# Patient Record
Sex: FEMALE | Race: BLACK OR AFRICAN AMERICAN | NOT HISPANIC OR LATINO | URBAN - METROPOLITAN AREA
[De-identification: names, ages, dates, MRNs, and addresses within clinical notes are randomized per-mention and may not be internally consistent; named-entity substitution may affect disease eponyms.]

---

## 2018-12-23 ENCOUNTER — EMERGENCY (EMERGENCY)
Facility: HOSPITAL | Age: 38
LOS: 1 days | Discharge: HOME | End: 2018-12-23
Attending: EMERGENCY MEDICINE | Admitting: EMERGENCY MEDICINE
Payer: MEDICAID

## 2018-12-23 VITALS
SYSTOLIC BLOOD PRESSURE: 130 MMHG | RESPIRATION RATE: 16 BRPM | TEMPERATURE: 98 F | HEART RATE: 72 BPM | DIASTOLIC BLOOD PRESSURE: 50 MMHG

## 2018-12-23 PROCEDURE — 99283 EMERGENCY DEPT VISIT LOW MDM: CPT

## 2018-12-23 PROCEDURE — 73502 X-RAY EXAM HIP UNI 2-3 VIEWS: CPT | Mod: 26,LT

## 2018-12-23 RX ORDER — IBUPROFEN 200 MG
1 TABLET ORAL
Qty: 30 | Refills: 0
Start: 2018-12-23 | End: 2019-01-01

## 2018-12-23 NOTE — ED PROVIDER NOTE - PROGRESS NOTE DETAILS
BREANNA Haley: Received signout and discussed plan with QUID attending. Xrays negative, pain well controlled. Stable for d/c home with followup. Pt amenable with plan.

## 2018-12-23 NOTE — ED PROVIDER NOTE - PLAN OF CARE
Rest, ice, elevate area.  Take Motrin 600mg every 8 hrs with food for pain.  Follow up with PMD within 48-72 hrs.  Any worsening pain, swelling, weakness, numbness return to ED. Ortho referral list provided please follow with them for further recommendations, possibly further imaging as deemed necessary.

## 2018-12-23 NOTE — ED PROVIDER NOTE - LOWER EXTREMITY EXAM, LEFT
tenderness w/ w/ abduction and flexion at the lt hip w/ no palpable masses, no ecchymosis, stable pelvis/TENDERNESS

## 2018-12-23 NOTE — ED PROVIDER NOTE - CARE PLAN
Principal Discharge DX:	Hip pain  Assessment and plan of treatment:	Rest, ice, elevate area.  Take Motrin 600mg every 8 hrs with food for pain.  Follow up with PMD within 48-72 hrs.  Any worsening pain, swelling, weakness, numbness return to ED. Ortho referral list provided please follow with them for further recommendations, possibly further imaging as deemed necessary.

## 2018-12-23 NOTE — ED PROVIDER NOTE - OBJECTIVE STATEMENT
39 y/o F w/ PMHx of Preeclampsia presents to ED c/o acute on chronic lt hip pain. Pt reports [id number] h/o atraumatic lt hip pain noted to be worsening in the last few days. States having difficulty ambulating secondary to pain. Denies other complaints. 37 y/o F w/ PMHx of Preeclampsia presents to ED c/o acute on chronic lt hip pain. Pt reports [id number] h/o atraumatic lt hip pain noted to be worsening in the last few days. States having difficulty ambulating secondary to pain. Pt recently traveled to the [facility] and returned a few days ago via a x5hr flight. Denies other complaints.

## 2018-12-23 NOTE — ED ADULT TRIAGE NOTE - CHIEF COMPLAINT QUOTE
pt amb to triage c/o L hip pain x 6 months worsening over past few days, states now having difficulty walking, + ROM in leg w/ pain, + pedal pulse, denies falls or trauma to area

## 2019-01-10 ENCOUNTER — APPOINTMENT (OUTPATIENT)
Dept: ORTHOPEDIC SURGERY | Facility: CLINIC | Age: 39
End: 2019-01-10

## 2021-07-21 ENCOUNTER — TRANSCRIPTION ENCOUNTER (OUTPATIENT)
Age: 41
End: 2021-07-21

## 2021-07-22 ENCOUNTER — RESULT REVIEW (OUTPATIENT)
Age: 41
End: 2021-07-22

## 2021-07-22 ENCOUNTER — OUTPATIENT (OUTPATIENT)
Dept: OUTPATIENT SERVICES | Facility: HOSPITAL | Age: 41
LOS: 1 days | Discharge: HOME | End: 2021-07-22
Payer: MEDICAID

## 2021-07-22 PROCEDURE — 88305 TISSUE EXAM BY PATHOLOGIST: CPT | Mod: 26

## 2021-07-22 PROCEDURE — 19318 BREAST REDUCTION: CPT | Mod: AS,50

## 2021-07-31 LAB — SURGICAL PATHOLOGY STUDY: SIGNIFICANT CHANGE UP

## 2023-03-10 ENCOUNTER — APPOINTMENT (OUTPATIENT)
Dept: OBGYN | Facility: CLINIC | Age: 43
End: 2023-03-10
Payer: COMMERCIAL

## 2023-03-10 VITALS
HEART RATE: 85 BPM | HEIGHT: 63 IN | SYSTOLIC BLOOD PRESSURE: 121 MMHG | BODY MASS INDEX: 27.64 KG/M2 | DIASTOLIC BLOOD PRESSURE: 73 MMHG | WEIGHT: 156 LBS

## 2023-03-10 DIAGNOSIS — Z78.9 OTHER SPECIFIED HEALTH STATUS: ICD-10-CM

## 2023-03-10 PROCEDURE — 99203 OFFICE O/P NEW LOW 30 MIN: CPT

## 2023-03-11 LAB
ALBUMIN SERPL ELPH-MCNC: 3.8 G/DL
ALP BLD-CCNC: 45 U/L
ALT SERPL-CCNC: 13 U/L
ANION GAP SERPL CALC-SCNC: 14 MMOL/L
AST SERPL-CCNC: 16 U/L
BASOPHILS # BLD AUTO: 0.04 K/UL
BASOPHILS NFR BLD AUTO: 0.4 %
BILIRUB SERPL-MCNC: 0.2 MG/DL
BUN SERPL-MCNC: 6 MG/DL
C TRACH RRNA SPEC QL NAA+PROBE: NOT DETECTED
CALCIUM SERPL-MCNC: 9.1 MG/DL
CHLORIDE SERPL-SCNC: 101 MMOL/L
CO2 SERPL-SCNC: 20 MMOL/L
CREAT SERPL-MCNC: 0.56 MG/DL
EGFR: 116 ML/MIN/1.73M2
EOSINOPHIL # BLD AUTO: 0.05 K/UL
EOSINOPHIL NFR BLD AUTO: 0.5 %
ESTIMATED AVERAGE GLUCOSE: 120 MG/DL
GLUCOSE SERPL-MCNC: 58 MG/DL
HBA1C MFR BLD HPLC: 5.8 %
HBV SURFACE AG SER QL: NONREACTIVE
HCT VFR BLD CALC: 36.1 %
HCV AB SER QL: NONREACTIVE
HCV S/CO RATIO: 0.27 S/CO
HGB BLD-MCNC: 12.1 G/DL
HIV1+2 AB SPEC QL IA.RAPID: NONREACTIVE
IMM GRANULOCYTES NFR BLD AUTO: 0.5 %
LYMPHOCYTES # BLD AUTO: 2.08 K/UL
LYMPHOCYTES NFR BLD AUTO: 19.1 %
MAN DIFF?: NORMAL
MCHC RBC-ENTMCNC: 28.5 PG
MCHC RBC-ENTMCNC: 33.5 GM/DL
MCV RBC AUTO: 85.1 FL
MEV IGG FLD QL IA: 24.6 AU/ML
MEV IGG+IGM SER-IMP: POSITIVE
MONOCYTES # BLD AUTO: 0.71 K/UL
MONOCYTES NFR BLD AUTO: 6.5 %
N GONORRHOEA RRNA SPEC QL NAA+PROBE: NOT DETECTED
NEUTROPHILS # BLD AUTO: 7.97 K/UL
NEUTROPHILS NFR BLD AUTO: 73 %
PLATELET # BLD AUTO: 291 K/UL
POTASSIUM SERPL-SCNC: 3.8 MMOL/L
PROT SERPL-MCNC: 7.1 G/DL
RBC # BLD: 4.24 M/UL
RBC # FLD: 14.2 %
RUBV IGG FLD-ACNC: 0.7 INDEX
RUBV IGG SER-IMP: NEGATIVE
SODIUM SERPL-SCNC: 135 MMOL/L
SOURCE AMPLIFICATION: NORMAL
T GONDII AB SER-IMP: NEGATIVE
T GONDII AB SER-IMP: POSITIVE
T GONDII IGG SER QL: >400 IU/ML
T GONDII IGM SER QL: 7.3 AU/ML
T PALLIDUM AB SER QL IA: NEGATIVE
TSH SERPL-ACNC: 1.43 UIU/ML
VZV AB TITR SER: NEGATIVE
VZV IGG SER IF-ACNC: 59.6 INDEX
WBC # FLD AUTO: 10.91 K/UL

## 2023-03-13 LAB
ABO + RH PNL BLD: NORMAL
BACTERIA UR CULT: NORMAL
BLD GP AB SCN SERPL QL: NORMAL
HGB A MFR BLD: 97.2 %
HGB A2 MFR BLD: 2.8 %
HGB FRACT BLD-IMP: NORMAL
HPV HIGH+LOW RISK DNA PNL CVX: NOT DETECTED
LEAD BLD-MCNC: <1 UG/DL

## 2023-03-15 ENCOUNTER — NON-APPOINTMENT (OUTPATIENT)
Age: 43
End: 2023-03-15

## 2023-03-15 LAB
AR GENE MUT ANL BLD/T: NORMAL
B19V IGG SER QL IA: 2.28 INDEX
B19V IGG+IGM SER-IMP: NORMAL
B19V IGG+IGM SER-IMP: POSITIVE
B19V IGM FLD-ACNC: 0.32 INDEX
B19V IGM SER-ACNC: NEGATIVE
CYTOLOGY CVX/VAG DOC THIN PREP: ABNORMAL
FMR1 GENE MUT ANL BLD/T: NORMAL
M TB IFN-G BLD-IMP: NEGATIVE
QUANTIFERON TB PLUS MITOGEN MINUS NIL: 7.53 IU/ML
QUANTIFERON TB PLUS NIL: 0.05 IU/ML
QUANTIFERON TB PLUS TB1 MINUS NIL: -0.01 IU/ML
QUANTIFERON TB PLUS TB2 MINUS NIL: 0.01 IU/ML

## 2023-03-16 ENCOUNTER — NON-APPOINTMENT (OUTPATIENT)
Age: 43
End: 2023-03-16

## 2023-03-16 ENCOUNTER — TRANSCRIPTION ENCOUNTER (OUTPATIENT)
Age: 43
End: 2023-03-16

## 2023-03-16 LAB — CFTR MUT TESTED BLD/T: NEGATIVE

## 2023-04-04 ENCOUNTER — APPOINTMENT (OUTPATIENT)
Dept: OBGYN | Facility: CLINIC | Age: 43
End: 2023-04-04
Payer: COMMERCIAL

## 2023-04-04 VITALS
WEIGHT: 162 LBS | HEIGHT: 63 IN | HEART RATE: 84 BPM | BODY MASS INDEX: 28.7 KG/M2 | DIASTOLIC BLOOD PRESSURE: 76 MMHG | SYSTOLIC BLOOD PRESSURE: 127 MMHG

## 2023-04-04 PROCEDURE — 0502F SUBSEQUENT PRENATAL CARE: CPT

## 2023-04-07 LAB
AFP MOM: 0.81
AFP VALUE: 37.9 NG/ML
ALPHA FETOPROTEIN SERUM COMMENT: NORMAL
ALPHA FETOPROTEIN SERUM INTERPRETATION: NORMAL
ALPHA FETOPROTEIN SERUM RESULTS: NORMAL
ALPHA FETOPROTEIN SERUM TEST RESULTS: NORMAL
GESTATIONAL AGE BASED ON: NORMAL
GESTATIONAL AGE ON COLLECTION DATE: 18 WEEKS
INSULIN DEP DIABETES: NO
MATERNAL AGE AT EDD AFP: 44.1 YR
MULTIPLE GESTATION: NO
OSBR RISK 1 IN: NORMAL
RACE: NORMAL
WEIGHT AFP: 162 LBS

## 2023-04-18 ENCOUNTER — NON-APPOINTMENT (OUTPATIENT)
Age: 43
End: 2023-04-18

## 2023-04-18 ENCOUNTER — ASOB RESULT (OUTPATIENT)
Age: 43
End: 2023-04-18

## 2023-04-18 ENCOUNTER — APPOINTMENT (OUTPATIENT)
Dept: ANTEPARTUM | Facility: CLINIC | Age: 43
End: 2023-04-18
Payer: MEDICAID

## 2023-04-18 PROCEDURE — 99202 OFFICE O/P NEW SF 15 MIN: CPT | Mod: 25

## 2023-04-18 PROCEDURE — 76811 OB US DETAILED SNGL FETUS: CPT

## 2023-04-24 ENCOUNTER — NON-APPOINTMENT (OUTPATIENT)
Age: 43
End: 2023-04-24

## 2023-05-04 ENCOUNTER — APPOINTMENT (OUTPATIENT)
Dept: OBGYN | Facility: CLINIC | Age: 43
End: 2023-05-04
Payer: MEDICAID

## 2023-05-04 ENCOUNTER — NON-APPOINTMENT (OUTPATIENT)
Age: 43
End: 2023-05-04

## 2023-05-04 VITALS
HEIGHT: 63 IN | DIASTOLIC BLOOD PRESSURE: 77 MMHG | BODY MASS INDEX: 30.12 KG/M2 | WEIGHT: 170 LBS | SYSTOLIC BLOOD PRESSURE: 131 MMHG

## 2023-05-04 PROCEDURE — 0502F SUBSEQUENT PRENATAL CARE: CPT

## 2023-05-08 ENCOUNTER — NON-APPOINTMENT (OUTPATIENT)
Age: 43
End: 2023-05-08

## 2023-05-26 ENCOUNTER — APPOINTMENT (OUTPATIENT)
Dept: ANTEPARTUM | Facility: CLINIC | Age: 43
End: 2023-05-26
Payer: MEDICAID

## 2023-05-26 ENCOUNTER — ASOB RESULT (OUTPATIENT)
Age: 43
End: 2023-05-26

## 2023-05-26 PROCEDURE — 76816 OB US FOLLOW-UP PER FETUS: CPT

## 2023-06-06 ENCOUNTER — APPOINTMENT (OUTPATIENT)
Dept: OBGYN | Facility: CLINIC | Age: 43
End: 2023-06-06
Payer: MEDICAID

## 2023-06-06 ENCOUNTER — NON-APPOINTMENT (OUTPATIENT)
Age: 43
End: 2023-06-06

## 2023-06-06 VITALS
WEIGHT: 174 LBS | BODY MASS INDEX: 30.83 KG/M2 | HEIGHT: 63 IN | DIASTOLIC BLOOD PRESSURE: 74 MMHG | SYSTOLIC BLOOD PRESSURE: 117 MMHG | HEART RATE: 96 BPM

## 2023-06-06 PROCEDURE — 0502F SUBSEQUENT PRENATAL CARE: CPT

## 2023-06-07 ENCOUNTER — NON-APPOINTMENT (OUTPATIENT)
Age: 43
End: 2023-06-07

## 2023-06-07 LAB
GLUCOSE 1H P 100 G GLC PO SERPL-MCNC: 170 MG/DL
T PALLIDUM AB SER QL IA: NEGATIVE

## 2023-06-21 ENCOUNTER — TRANSCRIPTION ENCOUNTER (OUTPATIENT)
Age: 43
End: 2023-06-21

## 2023-06-23 ENCOUNTER — ASOB RESULT (OUTPATIENT)
Age: 43
End: 2023-06-23

## 2023-06-23 ENCOUNTER — NON-APPOINTMENT (OUTPATIENT)
Age: 43
End: 2023-06-23

## 2023-06-23 ENCOUNTER — APPOINTMENT (OUTPATIENT)
Dept: OBGYN | Facility: CLINIC | Age: 43
End: 2023-06-23
Payer: MEDICAID

## 2023-06-23 ENCOUNTER — APPOINTMENT (OUTPATIENT)
Dept: ANTEPARTUM | Facility: CLINIC | Age: 43
End: 2023-06-23
Payer: MEDICAID

## 2023-06-23 VITALS
HEIGHT: 63 IN | HEART RATE: 95 BPM | BODY MASS INDEX: 31.01 KG/M2 | SYSTOLIC BLOOD PRESSURE: 138 MMHG | WEIGHT: 175 LBS | DIASTOLIC BLOOD PRESSURE: 67 MMHG

## 2023-06-23 PROCEDURE — 76819 FETAL BIOPHYS PROFIL W/O NST: CPT

## 2023-06-23 PROCEDURE — 0502F SUBSEQUENT PRENATAL CARE: CPT

## 2023-06-23 PROCEDURE — 76816 OB US FOLLOW-UP PER FETUS: CPT

## 2023-06-26 ENCOUNTER — TRANSCRIPTION ENCOUNTER (OUTPATIENT)
Age: 43
End: 2023-06-26

## 2023-07-11 ENCOUNTER — APPOINTMENT (OUTPATIENT)
Dept: OBGYN | Facility: CLINIC | Age: 43
End: 2023-07-11
Payer: MEDICAID

## 2023-07-11 VITALS
HEIGHT: 63 IN | SYSTOLIC BLOOD PRESSURE: 131 MMHG | DIASTOLIC BLOOD PRESSURE: 77 MMHG | BODY MASS INDEX: 31.18 KG/M2 | WEIGHT: 176 LBS

## 2023-07-11 PROCEDURE — 0502F SUBSEQUENT PRENATAL CARE: CPT

## 2023-07-21 ENCOUNTER — APPOINTMENT (OUTPATIENT)
Dept: ANTEPARTUM | Facility: CLINIC | Age: 43
End: 2023-07-21
Payer: MEDICAID

## 2023-07-21 ENCOUNTER — ASOB RESULT (OUTPATIENT)
Age: 43
End: 2023-07-21

## 2023-07-21 PROCEDURE — 76816 OB US FOLLOW-UP PER FETUS: CPT

## 2023-07-21 PROCEDURE — 99213 OFFICE O/P EST LOW 20 MIN: CPT | Mod: 25

## 2023-07-21 PROCEDURE — 76818 FETAL BIOPHYS PROFILE W/NST: CPT

## 2023-07-24 ENCOUNTER — TRANSCRIPTION ENCOUNTER (OUTPATIENT)
Age: 43
End: 2023-07-24

## 2023-07-25 ENCOUNTER — APPOINTMENT (OUTPATIENT)
Dept: OBGYN | Facility: CLINIC | Age: 43
End: 2023-07-25
Payer: MEDICAID

## 2023-07-25 ENCOUNTER — NON-APPOINTMENT (OUTPATIENT)
Age: 43
End: 2023-07-25

## 2023-07-25 VITALS
HEIGHT: 63 IN | WEIGHT: 178 LBS | DIASTOLIC BLOOD PRESSURE: 84 MMHG | SYSTOLIC BLOOD PRESSURE: 133 MMHG | BODY MASS INDEX: 31.54 KG/M2

## 2023-07-25 PROCEDURE — 0502F SUBSEQUENT PRENATAL CARE: CPT

## 2023-07-27 ENCOUNTER — APPOINTMENT (OUTPATIENT)
Dept: ANTEPARTUM | Facility: CLINIC | Age: 43
End: 2023-07-27
Payer: MEDICAID

## 2023-07-27 ENCOUNTER — ASOB RESULT (OUTPATIENT)
Age: 43
End: 2023-07-27

## 2023-07-27 PROCEDURE — 76818 FETAL BIOPHYS PROFILE W/NST: CPT

## 2023-08-04 ENCOUNTER — ASOB RESULT (OUTPATIENT)
Age: 43
End: 2023-08-04

## 2023-08-04 ENCOUNTER — APPOINTMENT (OUTPATIENT)
Dept: ANTEPARTUM | Facility: CLINIC | Age: 43
End: 2023-08-04
Payer: MEDICAID

## 2023-08-04 PROCEDURE — 76818 FETAL BIOPHYS PROFILE W/NST: CPT

## 2023-08-07 ENCOUNTER — APPOINTMENT (OUTPATIENT)
Dept: ANTEPARTUM | Facility: CLINIC | Age: 43
End: 2023-08-07

## 2023-08-08 ENCOUNTER — NON-APPOINTMENT (OUTPATIENT)
Age: 43
End: 2023-08-08

## 2023-08-08 ENCOUNTER — APPOINTMENT (OUTPATIENT)
Dept: OBGYN | Facility: CLINIC | Age: 43
End: 2023-08-08
Payer: MEDICAID

## 2023-08-08 VITALS
WEIGHT: 182 LBS | BODY MASS INDEX: 32.25 KG/M2 | HEIGHT: 63 IN | DIASTOLIC BLOOD PRESSURE: 80 MMHG | SYSTOLIC BLOOD PRESSURE: 135 MMHG | HEART RATE: 89 BPM

## 2023-08-08 DIAGNOSIS — O09.522 SUPERVISION OF ELDERLY MULTIGRAVIDA, SECOND TRIMESTER: ICD-10-CM

## 2023-08-08 PROCEDURE — 0502F SUBSEQUENT PRENATAL CARE: CPT

## 2023-08-09 LAB — HIV1+2 AB SPEC QL IA.RAPID: NONREACTIVE

## 2023-08-11 ENCOUNTER — APPOINTMENT (OUTPATIENT)
Dept: ANTEPARTUM | Facility: CLINIC | Age: 43
End: 2023-08-11
Payer: MEDICAID

## 2023-08-11 ENCOUNTER — ASOB RESULT (OUTPATIENT)
Age: 43
End: 2023-08-11

## 2023-08-11 LAB
GP B STREP DNA SPEC QL NAA+PROBE: NOT DETECTED
SOURCE GBS: NORMAL

## 2023-08-11 PROCEDURE — 76816 OB US FOLLOW-UP PER FETUS: CPT

## 2023-08-11 PROCEDURE — 76818 FETAL BIOPHYS PROFILE W/NST: CPT

## 2023-08-15 ENCOUNTER — NON-APPOINTMENT (OUTPATIENT)
Age: 43
End: 2023-08-15

## 2023-08-15 ENCOUNTER — APPOINTMENT (OUTPATIENT)
Dept: OBGYN | Facility: CLINIC | Age: 43
End: 2023-08-15
Payer: MEDICAID

## 2023-08-15 VITALS
HEIGHT: 63 IN | WEIGHT: 182 LBS | SYSTOLIC BLOOD PRESSURE: 142 MMHG | DIASTOLIC BLOOD PRESSURE: 82 MMHG | HEART RATE: 94 BPM | BODY MASS INDEX: 32.25 KG/M2

## 2023-08-15 VITALS — DIASTOLIC BLOOD PRESSURE: 84 MMHG | SYSTOLIC BLOOD PRESSURE: 139 MMHG

## 2023-08-15 PROCEDURE — 0502F SUBSEQUENT PRENATAL CARE: CPT

## 2023-08-18 ENCOUNTER — ASOB RESULT (OUTPATIENT)
Age: 43
End: 2023-08-18

## 2023-08-18 ENCOUNTER — APPOINTMENT (OUTPATIENT)
Dept: ANTEPARTUM | Facility: CLINIC | Age: 43
End: 2023-08-18
Payer: MEDICAID

## 2023-08-18 PROCEDURE — 76818 FETAL BIOPHYS PROFILE W/NST: CPT

## 2023-08-22 ENCOUNTER — APPOINTMENT (OUTPATIENT)
Dept: OBGYN | Facility: CLINIC | Age: 43
End: 2023-08-22
Payer: MEDICAID

## 2023-08-22 ENCOUNTER — NON-APPOINTMENT (OUTPATIENT)
Age: 43
End: 2023-08-22

## 2023-08-22 ENCOUNTER — INPATIENT (INPATIENT)
Facility: HOSPITAL | Age: 43
LOS: 3 days | Discharge: HOME HEALTH | End: 2023-08-26
Attending: STUDENT IN AN ORGANIZED HEALTH CARE EDUCATION/TRAINING PROGRAM | Admitting: STUDENT IN AN ORGANIZED HEALTH CARE EDUCATION/TRAINING PROGRAM
Payer: MEDICAID

## 2023-08-22 VITALS
DIASTOLIC BLOOD PRESSURE: 83 MMHG | HEART RATE: 91 BPM | WEIGHT: 184 LBS | BODY MASS INDEX: 32.6 KG/M2 | SYSTOLIC BLOOD PRESSURE: 143 MMHG | HEIGHT: 63 IN

## 2023-08-22 VITALS — TEMPERATURE: 98 F | RESPIRATION RATE: 16 BRPM

## 2023-08-22 DIAGNOSIS — Z98.890 OTHER SPECIFIED POSTPROCEDURAL STATES: Chronic | ICD-10-CM

## 2023-08-22 DIAGNOSIS — Z96.641 PRESENCE OF RIGHT ARTIFICIAL HIP JOINT: Chronic | ICD-10-CM

## 2023-08-22 DIAGNOSIS — O09.523 SUPERVISION OF ELDERLY MULTIGRAVIDA, THIRD TRIMESTER: ICD-10-CM

## 2023-08-22 DIAGNOSIS — O14.90 UNSPECIFIED PRE-ECLAMPSIA, UNSPECIFIED TRIMESTER: ICD-10-CM

## 2023-08-22 DIAGNOSIS — O26.899 OTHER SPECIFIED PREGNANCY RELATED CONDITIONS, UNSPECIFIED TRIMESTER: ICD-10-CM

## 2023-08-22 LAB
ALBUMIN SERPL ELPH-MCNC: 3.4 G/DL — SIGNIFICANT CHANGE UP (ref 3.3–5)
ALP SERPL-CCNC: 190 U/L — HIGH (ref 40–120)
ALT FLD-CCNC: 12 U/L — SIGNIFICANT CHANGE UP (ref 4–33)
ANION GAP SERPL CALC-SCNC: 15 MMOL/L — HIGH (ref 7–14)
APPEARANCE UR: ABNORMAL
APTT BLD: 27.3 SEC — SIGNIFICANT CHANGE UP (ref 24.5–35.6)
AST SERPL-CCNC: 24 U/L — SIGNIFICANT CHANGE UP (ref 4–32)
BACTERIA # UR AUTO: ABNORMAL /HPF
BASOPHILS # BLD AUTO: 0.04 K/UL — SIGNIFICANT CHANGE UP (ref 0–0.2)
BASOPHILS NFR BLD AUTO: 0.3 % — SIGNIFICANT CHANGE UP (ref 0–2)
BILIRUB SERPL-MCNC: 0.2 MG/DL — SIGNIFICANT CHANGE UP (ref 0.2–1.2)
BILIRUB UR-MCNC: NEGATIVE — SIGNIFICANT CHANGE UP
BLD GP AB SCN SERPL QL: NEGATIVE — SIGNIFICANT CHANGE UP
BUN SERPL-MCNC: 7 MG/DL — SIGNIFICANT CHANGE UP (ref 7–23)
CALCIUM SERPL-MCNC: 9.5 MG/DL — SIGNIFICANT CHANGE UP (ref 8.4–10.5)
CAST: 0 /LPF — SIGNIFICANT CHANGE UP (ref 0–4)
CHLORIDE SERPL-SCNC: 101 MMOL/L — SIGNIFICANT CHANGE UP (ref 98–107)
CO2 SERPL-SCNC: 18 MMOL/L — LOW (ref 22–31)
COLOR SPEC: YELLOW — SIGNIFICANT CHANGE UP
CREAT ?TM UR-MCNC: 77 MG/DL — SIGNIFICANT CHANGE UP
CREAT SERPL-MCNC: 0.55 MG/DL — SIGNIFICANT CHANGE UP (ref 0.5–1.3)
DIFF PNL FLD: NEGATIVE — SIGNIFICANT CHANGE UP
EGFR: 116 ML/MIN/1.73M2 — SIGNIFICANT CHANGE UP
EOSINOPHIL # BLD AUTO: 0.08 K/UL — SIGNIFICANT CHANGE UP (ref 0–0.5)
EOSINOPHIL NFR BLD AUTO: 0.6 % — SIGNIFICANT CHANGE UP (ref 0–6)
FIBRINOGEN PPP-MCNC: 639 MG/DL — HIGH (ref 200–465)
GLUCOSE SERPL-MCNC: 115 MG/DL — HIGH (ref 70–99)
GLUCOSE UR QL: NEGATIVE MG/DL — SIGNIFICANT CHANGE UP
HCT VFR BLD CALC: 36.4 % — SIGNIFICANT CHANGE UP (ref 34.5–45)
HGB BLD-MCNC: 11.6 G/DL — SIGNIFICANT CHANGE UP (ref 11.5–15.5)
IANC: 8.82 K/UL — HIGH (ref 1.8–7.4)
IMM GRANULOCYTES NFR BLD AUTO: 1.4 % — HIGH (ref 0–0.9)
INR BLD: <0.9 RATIO — SIGNIFICANT CHANGE UP (ref 0.85–1.18)
KETONES UR-MCNC: ABNORMAL MG/DL
LDH SERPL L TO P-CCNC: 313 U/L — HIGH (ref 135–225)
LEUKOCYTE ESTERASE UR-ACNC: ABNORMAL
LYMPHOCYTES # BLD AUTO: 2.76 K/UL — SIGNIFICANT CHANGE UP (ref 1–3.3)
LYMPHOCYTES # BLD AUTO: 21.2 % — SIGNIFICANT CHANGE UP (ref 13–44)
MCHC RBC-ENTMCNC: 26.4 PG — LOW (ref 27–34)
MCHC RBC-ENTMCNC: 31.9 GM/DL — LOW (ref 32–36)
MCV RBC AUTO: 82.7 FL — SIGNIFICANT CHANGE UP (ref 80–100)
MONOCYTES # BLD AUTO: 1.14 K/UL — HIGH (ref 0–0.9)
MONOCYTES NFR BLD AUTO: 8.8 % — SIGNIFICANT CHANGE UP (ref 2–14)
NEUTROPHILS # BLD AUTO: 8.82 K/UL — HIGH (ref 1.8–7.4)
NEUTROPHILS NFR BLD AUTO: 67.7 % — SIGNIFICANT CHANGE UP (ref 43–77)
NITRITE UR-MCNC: NEGATIVE — SIGNIFICANT CHANGE UP
NRBC # BLD: 0 /100 WBCS — SIGNIFICANT CHANGE UP (ref 0–0)
NRBC # FLD: 0.07 K/UL — HIGH (ref 0–0)
PH UR: 6.5 — SIGNIFICANT CHANGE UP (ref 5–8)
PLATELET # BLD AUTO: 250 K/UL — SIGNIFICANT CHANGE UP (ref 150–400)
POTASSIUM SERPL-MCNC: 4 MMOL/L — SIGNIFICANT CHANGE UP (ref 3.5–5.3)
POTASSIUM SERPL-SCNC: 4 MMOL/L — SIGNIFICANT CHANGE UP (ref 3.5–5.3)
PROT ?TM UR-MCNC: 46 MG/DL — SIGNIFICANT CHANGE UP
PROT ?TM UR-MCNC: 46 MG/DL — SIGNIFICANT CHANGE UP
PROT SERPL-MCNC: 7.7 G/DL — SIGNIFICANT CHANGE UP (ref 6–8.3)
PROT UR-MCNC: 30 MG/DL
PROT/CREAT UR-RTO: 0.6 RATIO — HIGH (ref 0–0.2)
PROTHROM AB SERPL-ACNC: 9.6 SEC — SIGNIFICANT CHANGE UP (ref 9.5–13)
RBC # BLD: 4.4 M/UL — SIGNIFICANT CHANGE UP (ref 3.8–5.2)
RBC # FLD: 16.4 % — HIGH (ref 10.3–14.5)
RBC CASTS # UR COMP ASSIST: 2 /HPF — SIGNIFICANT CHANGE UP (ref 0–4)
REVIEW: SIGNIFICANT CHANGE UP
RH IG SCN BLD-IMP: POSITIVE — SIGNIFICANT CHANGE UP
RH IG SCN BLD-IMP: POSITIVE — SIGNIFICANT CHANGE UP
SODIUM SERPL-SCNC: 134 MMOL/L — LOW (ref 135–145)
SP GR SPEC: 1.02 — SIGNIFICANT CHANGE UP (ref 1–1.03)
SQUAMOUS # UR AUTO: 6 /HPF — HIGH (ref 0–5)
URATE SERPL-MCNC: 4.6 MG/DL — SIGNIFICANT CHANGE UP (ref 2.5–7)
UROBILINOGEN FLD QL: 1 MG/DL — SIGNIFICANT CHANGE UP (ref 0.2–1)
WBC # BLD: 13.02 K/UL — HIGH (ref 3.8–10.5)
WBC # FLD AUTO: 13.02 K/UL — HIGH (ref 3.8–10.5)
WBC UR QL: 8 /HPF — HIGH (ref 0–5)

## 2023-08-22 PROCEDURE — 0502F SUBSEQUENT PRENATAL CARE: CPT

## 2023-08-22 RX ORDER — SODIUM CHLORIDE 9 MG/ML
1000 INJECTION, SOLUTION INTRAVENOUS
Refills: 0 | Status: DISCONTINUED | OUTPATIENT
Start: 2023-08-22 | End: 2023-08-23

## 2023-08-22 RX ORDER — MAGNESIUM SULFATE 500 MG/ML
1.5 VIAL (ML) INJECTION
Qty: 40 | Refills: 0 | Status: DISCONTINUED | OUTPATIENT
Start: 2023-08-22 | End: 2023-08-25

## 2023-08-22 RX ORDER — OXYTOCIN 10 UNIT/ML
333.33 VIAL (ML) INJECTION
Qty: 20 | Refills: 0 | Status: DISCONTINUED | OUTPATIENT
Start: 2023-08-22 | End: 2023-08-24

## 2023-08-22 RX ORDER — CITRIC ACID/SODIUM CITRATE 300-500 MG
15 SOLUTION, ORAL ORAL EVERY 6 HOURS
Refills: 0 | Status: DISCONTINUED | OUTPATIENT
Start: 2023-08-22 | End: 2023-08-24

## 2023-08-22 RX ORDER — CHLORHEXIDINE GLUCONATE 213 G/1000ML
1 SOLUTION TOPICAL DAILY
Refills: 0 | Status: DISCONTINUED | OUTPATIENT
Start: 2023-08-22 | End: 2023-08-24

## 2023-08-22 RX ORDER — SODIUM CHLORIDE 9 MG/ML
1000 INJECTION, SOLUTION INTRAVENOUS ONCE
Refills: 0 | Status: DISCONTINUED | OUTPATIENT
Start: 2023-08-22 | End: 2023-08-24

## 2023-08-22 RX ORDER — LABETALOL HCL 100 MG
20 TABLET ORAL ONCE
Refills: 0 | Status: COMPLETED | OUTPATIENT
Start: 2023-08-22 | End: 2023-08-22

## 2023-08-22 RX ORDER — SODIUM CHLORIDE 9 MG/ML
1000 INJECTION, SOLUTION INTRAVENOUS
Refills: 0 | Status: DISCONTINUED | OUTPATIENT
Start: 2023-08-22 | End: 2023-08-24

## 2023-08-22 RX ORDER — NIFEDIPINE 30 MG
10 TABLET, EXTENDED RELEASE 24 HR ORAL ONCE
Refills: 0 | Status: DISCONTINUED | OUTPATIENT
Start: 2023-08-22 | End: 2023-08-22

## 2023-08-22 RX ORDER — MAGNESIUM SULFATE 500 MG/ML
4 VIAL (ML) INJECTION ONCE
Refills: 0 | Status: COMPLETED | OUTPATIENT
Start: 2023-08-22 | End: 2023-08-22

## 2023-08-22 RX ADMIN — Medication 300 GRAM(S): at 22:00

## 2023-08-22 RX ADMIN — CHLORHEXIDINE GLUCONATE 1 APPLICATION(S): 213 SOLUTION TOPICAL at 23:14

## 2023-08-22 RX ADMIN — Medication 20 MILLIGRAM(S): at 21:29

## 2023-08-22 RX ADMIN — Medication 50 GM/HR: at 22:26

## 2023-08-22 RX ADMIN — SODIUM CHLORIDE 125 MILLILITER(S): 9 INJECTION, SOLUTION INTRAVENOUS at 21:57

## 2023-08-22 RX ADMIN — SODIUM CHLORIDE 50 MILLILITER(S): 9 INJECTION, SOLUTION INTRAVENOUS at 23:14

## 2023-08-22 NOTE — OB PROVIDER H&P - NSHPLABSRESULTS_GEN_ALL_CORE
CBC Full  -  ( 22 Aug 2023 21:50 )  WBC Count : 13.02 K/uL  RBC Count : 4.40 M/uL  Hemoglobin : 11.6 g/dL  Hematocrit : 36.4 %  Platelet Count - Automated : 250 K/uL  Mean Cell Volume : 82.7 fL  Mean Cell Hemoglobin : 26.4 pg  Mean Cell Hemoglobin Concentration : 31.9 gm/dL  Auto Neutrophil # : 8.82 K/uL  Auto Lymphocyte # : 2.76 K/uL  Auto Monocyte # : 1.14 K/uL  Auto Eosinophil # : 0.08 K/uL  Auto Basophil # : 0.04 K/uL  Auto Neutrophil % : 67.7 %  Auto Lymphocyte % : 21.2 %  Auto Monocyte % : 8.8 %  Auto Eosinophil % : 0.6 %  Auto Basophil % : 0.3 %      134<L>  |  101  |  7   ----------------------------<  115<H>  4.0   |  18<L>  |  0.55    Ca    9.5      22 Aug 2023 21:50    TPro  7.7  /  Alb  3.4  /  TBili  0.2  /  DBili  x   /  AST  24  /  ALT  12  /  AlkPhos  190<H>    PT/INR - ( 22 Aug 2023 21:50 )   PT: 9.6 sec;   INR: <0.90 ratio         PTT - ( 22 Aug 2023 21:50 )  PTT:27.3 sec  fibrinogen 639  Urinalysis Basic - ( 22 Aug 2023 21:50 )    Color: Yellow / Appearance: Cloudy / S.016 / pH: x  Gluc: 115 mg/dL / Ketone: Trace mg/dL  / Bili: Negative / Urobili: 1.0 mg/dL   Blood: x / Protein: 30 mg/dL / Nitrite: Negative   Leuk Esterase: Trace / RBC: 2 /HPF / WBC 8 /HPF   Sq Epi: x / Non Sq Epi: 6 /HPF / Bacteria: Moderate /HPF    PCR 0.6

## 2023-08-22 NOTE — OB PROVIDER H&P - NSLOWPPHRISK_OBGYN_A_OB
No previous uterine incision/Wilson Pregnancy/Less than or equal to 4 previous vaginal births/No known bleeding disorder/No history of postpartum hemorrhage

## 2023-08-22 NOTE — OB PROVIDER H&P - NSHPPHYSICALEXAM_GEN_ALL_CORE
Vital Signs Last 24 Hrs  T(C): 36.8 (22 Aug 2023 21:08), Max: 36.8 (22 Aug 2023 21:01)  T(F): 98.2 (22 Aug 2023 21:08), Max: 98.24 (22 Aug 2023 21:01)  HR: 89 (22 Aug 2023 22:05) (84 - 90)  BP: 148/63 (22 Aug 2023 22:00) (137/64 - 171/77)  BP(mean): --  RR: 16 (22 Aug 2023 21:08) (16 - 16)  SpO2: 99% (22 Aug 2023 22:05) (99% - 99%)          Gen: NAD  Head: NC/AT  Cardio: S1S2+, RRR  Resp: CTABL, no wheezing  Abdomen: Soft, NT/ND, BS+  Extremities: No LE edema bilaterally    NST-->FHR: 140 HR baseline, moderate variability, accelerations present, no decelerations, reactive NST.  [place]: Contractions present, irregular,  TAUS: vertex confirmed by bedside sonogram, anterior placenta saved in ASOB  SVE: 0.5/long/-3

## 2023-08-22 NOTE — OB PROVIDER H&P - NS_OBGYNHISTORY_OBGYN_ALL_OB_FT
ft  08 wt. 7lbs 15oz  miscarriage x1 w/ d&c  GYN hx: denies hx of abnormal papsmear/cysts/fibroids/STDs

## 2023-08-22 NOTE — OB RN TRIAGE NOTE - NSICDXPASTSURGICALHX_GEN_ALL_CORE_FT
PAST SURGICAL HISTORY:  H/O bilateral breast reduction surgery     History of dilatation and curettage     History of right hip replacement

## 2023-08-22 NOTE — OB PROVIDER H&P - HISTORY OF PRESENT ILLNESS
42 yo , EGA@38 weeks, presented to D&T from the MD office with c/o elevated blood pressure from the office of 140/80's, Pt report that last weeks her blood pressure was also elevated for the first time in the pregnancy. mild HA today pain 4/10, also C/O of contractions irregular, every 7-8mns pain 4/10 denies vaginal bleeding, leakage of fluid, and reports fetal movement.  Denies changes in vision, chest pain, palpitations, shortness of breath.    Prenatal care with Dr. Walters  Prenatal course is uncomplicated.    GBS status is negative 23    Meds: PNV, ASA 81mg po daily stopped a couple weeks ago.   Allergies: NKDA

## 2023-08-22 NOTE — OB PROVIDER H&P - ASSESSMENT
44 yo , EGA@38 weeks PEC 44 yo , EGA@38 weeks PEC   discuss with Dr. Sood  167/74 repeat 171/77 labetalol 20mg IVP  magnesium sulfate drip.    Patient admitted for IOL   with Buccal and CRB  For epi PRN  routine orders  meds ordered  consents signed by patient.    ELIZABETH alvarez NP

## 2023-08-22 NOTE — OB RN PATIENT PROFILE - HBSAG: DATE, OB PROFILE
Pt's  calling stating he needs to speak with Shani Lam. No other information was given.   please call back
Spoke with patient, she wanted to clarify her appointment date with - Dec. 2nd.
22-Aug-2023

## 2023-08-22 NOTE — OB RN TRIAGE NOTE - CHIEF COMPLAINT QUOTE
Pt. was referred from MD's office for elevated /83 w/ mild headache 4/10 pain scale . Pt. is scheduled for induction next week forelevated BP .

## 2023-08-22 NOTE — OB RN PATIENT PROFILE - NS PRO DEPRESSION SCREENING Y/N1
----- Message from Rosalie Malin Dr sent at 1/6/2018  4:03 PM EST -----  Regarding: Dr. Jayleen Lovelace / Nelly Midget: [phone]  Pt needs refill fr Freestyle Test Strips 50s called into [facility] pharmacy on [facility]. [phone].
no

## 2023-08-23 DIAGNOSIS — O16.9 UNSPECIFIED MATERNAL HYPERTENSION, UNSPECIFIED TRIMESTER: ICD-10-CM

## 2023-08-23 LAB
ALBUMIN SERPL ELPH-MCNC: 3.3 G/DL — SIGNIFICANT CHANGE UP (ref 3.3–5)
ALBUMIN SERPL ELPH-MCNC: 3.4 G/DL — SIGNIFICANT CHANGE UP (ref 3.3–5)
ALP SERPL-CCNC: 176 U/L — HIGH (ref 40–120)
ALP SERPL-CCNC: 194 U/L — HIGH (ref 40–120)
ALT FLD-CCNC: 12 U/L — SIGNIFICANT CHANGE UP (ref 4–33)
ALT FLD-CCNC: 14 U/L — SIGNIFICANT CHANGE UP (ref 4–33)
ANION GAP SERPL CALC-SCNC: 12 MMOL/L — SIGNIFICANT CHANGE UP (ref 7–14)
ANION GAP SERPL CALC-SCNC: 18 MMOL/L — HIGH (ref 7–14)
APTT BLD: 23.6 SEC — LOW (ref 24.5–35.6)
APTT BLD: 24.9 SEC — SIGNIFICANT CHANGE UP (ref 24.5–35.6)
AST SERPL-CCNC: 24 U/L — SIGNIFICANT CHANGE UP (ref 4–32)
AST SERPL-CCNC: 25 U/L — SIGNIFICANT CHANGE UP (ref 4–32)
BASOPHILS # BLD AUTO: 0.02 K/UL — SIGNIFICANT CHANGE UP (ref 0–0.2)
BASOPHILS # BLD AUTO: 0.03 K/UL — SIGNIFICANT CHANGE UP (ref 0–0.2)
BASOPHILS NFR BLD AUTO: 0.1 % — SIGNIFICANT CHANGE UP (ref 0–2)
BASOPHILS NFR BLD AUTO: 0.2 % — SIGNIFICANT CHANGE UP (ref 0–2)
BILIRUB SERPL-MCNC: 0.3 MG/DL — SIGNIFICANT CHANGE UP (ref 0.2–1.2)
BILIRUB SERPL-MCNC: 0.5 MG/DL — SIGNIFICANT CHANGE UP (ref 0.2–1.2)
BUN SERPL-MCNC: 5 MG/DL — LOW (ref 7–23)
BUN SERPL-MCNC: 7 MG/DL — SIGNIFICANT CHANGE UP (ref 7–23)
CALCIUM SERPL-MCNC: 8.1 MG/DL — LOW (ref 8.4–10.5)
CALCIUM SERPL-MCNC: 8.5 MG/DL — SIGNIFICANT CHANGE UP (ref 8.4–10.5)
CHLORIDE SERPL-SCNC: 101 MMOL/L — SIGNIFICANT CHANGE UP (ref 98–107)
CHLORIDE SERPL-SCNC: 98 MMOL/L — SIGNIFICANT CHANGE UP (ref 98–107)
CO2 SERPL-SCNC: 16 MMOL/L — LOW (ref 22–31)
CO2 SERPL-SCNC: 18 MMOL/L — LOW (ref 22–31)
CREAT SERPL-MCNC: 0.6 MG/DL — SIGNIFICANT CHANGE UP (ref 0.5–1.3)
CREAT SERPL-MCNC: 0.63 MG/DL — SIGNIFICANT CHANGE UP (ref 0.5–1.3)
EGFR: 112 ML/MIN/1.73M2 — SIGNIFICANT CHANGE UP
EGFR: 113 ML/MIN/1.73M2 — SIGNIFICANT CHANGE UP
EOSINOPHIL # BLD AUTO: 0.02 K/UL — SIGNIFICANT CHANGE UP (ref 0–0.5)
EOSINOPHIL # BLD AUTO: 0.04 K/UL — SIGNIFICANT CHANGE UP (ref 0–0.5)
EOSINOPHIL NFR BLD AUTO: 0.1 % — SIGNIFICANT CHANGE UP (ref 0–6)
EOSINOPHIL NFR BLD AUTO: 0.3 % — SIGNIFICANT CHANGE UP (ref 0–6)
FIBRINOGEN PPP-MCNC: 658 MG/DL — HIGH (ref 200–465)
FIBRINOGEN PPP-MCNC: 699 MG/DL — HIGH (ref 200–465)
GLUCOSE SERPL-MCNC: 100 MG/DL — HIGH (ref 70–99)
GLUCOSE SERPL-MCNC: 110 MG/DL — HIGH (ref 70–99)
HCT VFR BLD CALC: 33.8 % — LOW (ref 34.5–45)
HCT VFR BLD CALC: 35 % — SIGNIFICANT CHANGE UP (ref 34.5–45)
HGB BLD-MCNC: 11 G/DL — LOW (ref 11.5–15.5)
HGB BLD-MCNC: 11.2 G/DL — LOW (ref 11.5–15.5)
IANC: 11.01 K/UL — HIGH (ref 1.8–7.4)
IANC: 12.93 K/UL — HIGH (ref 1.8–7.4)
IMM GRANULOCYTES NFR BLD AUTO: 0.5 % — SIGNIFICANT CHANGE UP (ref 0–0.9)
IMM GRANULOCYTES NFR BLD AUTO: 0.8 % — SIGNIFICANT CHANGE UP (ref 0–0.9)
INR BLD: 0.84 RATIO — SIGNIFICANT CHANGE UP (ref 0.85–1.18)
LDH SERPL L TO P-CCNC: 266 U/L — HIGH (ref 135–225)
LDH SERPL L TO P-CCNC: 299 U/L — HIGH (ref 135–225)
LYMPHOCYTES # BLD AUTO: 1.64 K/UL — SIGNIFICANT CHANGE UP (ref 1–3.3)
LYMPHOCYTES # BLD AUTO: 10.5 % — LOW (ref 13–44)
LYMPHOCYTES # BLD AUTO: 14.9 % — SIGNIFICANT CHANGE UP (ref 13–44)
LYMPHOCYTES # BLD AUTO: 2.13 K/UL — SIGNIFICANT CHANGE UP (ref 1–3.3)
MAGNESIUM SERPL-MCNC: 4.8 MG/DL — HIGH (ref 1.6–2.6)
MAGNESIUM SERPL-MCNC: 6 MG/DL — HIGH (ref 1.6–2.6)
MAGNESIUM SERPL-MCNC: 6.1 MG/DL — HIGH (ref 1.6–2.6)
MCHC RBC-ENTMCNC: 25.9 PG — LOW (ref 27–34)
MCHC RBC-ENTMCNC: 27 PG — SIGNIFICANT CHANGE UP (ref 27–34)
MCHC RBC-ENTMCNC: 32 GM/DL — SIGNIFICANT CHANGE UP (ref 32–36)
MCHC RBC-ENTMCNC: 32.5 GM/DL — SIGNIFICANT CHANGE UP (ref 32–36)
MCV RBC AUTO: 81 FL — SIGNIFICANT CHANGE UP (ref 80–100)
MCV RBC AUTO: 82.8 FL — SIGNIFICANT CHANGE UP (ref 80–100)
MONOCYTES # BLD AUTO: 0.9 K/UL — SIGNIFICANT CHANGE UP (ref 0–0.9)
MONOCYTES # BLD AUTO: 0.94 K/UL — HIGH (ref 0–0.9)
MONOCYTES NFR BLD AUTO: 5.8 % — SIGNIFICANT CHANGE UP (ref 2–14)
MONOCYTES NFR BLD AUTO: 6.6 % — SIGNIFICANT CHANGE UP (ref 2–14)
NEUTROPHILS # BLD AUTO: 11.01 K/UL — HIGH (ref 1.8–7.4)
NEUTROPHILS # BLD AUTO: 12.93 K/UL — HIGH (ref 1.8–7.4)
NEUTROPHILS NFR BLD AUTO: 77.2 % — HIGH (ref 43–77)
NEUTROPHILS NFR BLD AUTO: 83 % — HIGH (ref 43–77)
NRBC # BLD: 0 /100 WBCS — SIGNIFICANT CHANGE UP (ref 0–0)
NRBC # BLD: 0 /100 WBCS — SIGNIFICANT CHANGE UP (ref 0–0)
NRBC # FLD: 0 K/UL — SIGNIFICANT CHANGE UP (ref 0–0)
NRBC # FLD: 0.03 K/UL — HIGH (ref 0–0)
PLATELET # BLD AUTO: 246 K/UL — SIGNIFICANT CHANGE UP (ref 150–400)
PLATELET # BLD AUTO: 267 K/UL — SIGNIFICANT CHANGE UP (ref 150–400)
POTASSIUM SERPL-MCNC: 3.9 MMOL/L — SIGNIFICANT CHANGE UP (ref 3.5–5.3)
POTASSIUM SERPL-MCNC: 4.2 MMOL/L — SIGNIFICANT CHANGE UP (ref 3.5–5.3)
POTASSIUM SERPL-SCNC: 3.9 MMOL/L — SIGNIFICANT CHANGE UP (ref 3.5–5.3)
POTASSIUM SERPL-SCNC: 4.2 MMOL/L — SIGNIFICANT CHANGE UP (ref 3.5–5.3)
PROT SERPL-MCNC: 7.2 G/DL — SIGNIFICANT CHANGE UP (ref 6–8.3)
PROT SERPL-MCNC: 7.4 G/DL — SIGNIFICANT CHANGE UP (ref 6–8.3)
PROTHROM AB SERPL-ACNC: 9.5 SEC — SIGNIFICANT CHANGE UP (ref 9.5–13)
RBC # BLD: 4.08 M/UL — SIGNIFICANT CHANGE UP (ref 3.8–5.2)
RBC # BLD: 4.32 M/UL — SIGNIFICANT CHANGE UP (ref 3.8–5.2)
RBC # FLD: 16.4 % — HIGH (ref 10.3–14.5)
RBC # FLD: 16.5 % — HIGH (ref 10.3–14.5)
SODIUM SERPL-SCNC: 131 MMOL/L — LOW (ref 135–145)
SODIUM SERPL-SCNC: 132 MMOL/L — LOW (ref 135–145)
T PALLIDUM AB TITR SER: NEGATIVE — SIGNIFICANT CHANGE UP
URATE SERPL-MCNC: 5.3 MG/DL — SIGNIFICANT CHANGE UP (ref 2.5–7)
WBC # BLD: 14.26 K/UL — HIGH (ref 3.8–10.5)
WBC # BLD: 15.59 K/UL — HIGH (ref 3.8–10.5)
WBC # FLD AUTO: 14.26 K/UL — HIGH (ref 3.8–10.5)
WBC # FLD AUTO: 15.59 K/UL — HIGH (ref 3.8–10.5)

## 2023-08-23 RX ORDER — NIFEDIPINE 30 MG
30 TABLET, EXTENDED RELEASE 24 HR ORAL DAILY
Refills: 0 | Status: DISCONTINUED | OUTPATIENT
Start: 2023-08-23 | End: 2023-08-24

## 2023-08-23 RX ORDER — SODIUM CHLORIDE 9 MG/ML
1000 INJECTION INTRAMUSCULAR; INTRAVENOUS; SUBCUTANEOUS
Refills: 0 | Status: DISCONTINUED | OUTPATIENT
Start: 2023-08-23 | End: 2023-08-26

## 2023-08-23 RX ORDER — SODIUM CHLORIDE 9 MG/ML
500 INJECTION INTRAMUSCULAR; INTRAVENOUS; SUBCUTANEOUS ONCE
Refills: 0 | Status: COMPLETED | OUTPATIENT
Start: 2023-08-23 | End: 2023-08-23

## 2023-08-23 RX ORDER — SODIUM CHLORIDE 9 MG/ML
250 INJECTION, SOLUTION INTRAVENOUS ONCE
Refills: 0 | Status: COMPLETED | OUTPATIENT
Start: 2023-08-23 | End: 2023-08-23

## 2023-08-23 RX ORDER — OXYTOCIN 10 UNIT/ML
2 VIAL (ML) INJECTION
Qty: 30 | Refills: 0 | Status: DISCONTINUED | OUTPATIENT
Start: 2023-08-23 | End: 2023-08-24

## 2023-08-23 RX ADMIN — Medication 30 MILLIGRAM(S): at 14:10

## 2023-08-23 RX ADMIN — Medication 2 MILLIUNIT(S)/MIN: at 09:54

## 2023-08-23 RX ADMIN — Medication 50 GM/HR: at 19:15

## 2023-08-23 RX ADMIN — CHLORHEXIDINE GLUCONATE 1 APPLICATION(S): 213 SOLUTION TOPICAL at 17:24

## 2023-08-23 RX ADMIN — SODIUM CHLORIDE 125 MILLILITER(S): 9 INJECTION INTRAMUSCULAR; INTRAVENOUS; SUBCUTANEOUS at 21:49

## 2023-08-23 RX ADMIN — SODIUM CHLORIDE 2000 MILLILITER(S): 9 INJECTION INTRAMUSCULAR; INTRAVENOUS; SUBCUTANEOUS at 21:24

## 2023-08-23 RX ADMIN — SODIUM CHLORIDE 500 MILLILITER(S): 9 INJECTION, SOLUTION INTRAVENOUS at 04:35

## 2023-08-24 ENCOUNTER — TRANSCRIPTION ENCOUNTER (OUTPATIENT)
Age: 43
End: 2023-08-24

## 2023-08-24 LAB
ALBUMIN SERPL ELPH-MCNC: 3.5 G/DL — SIGNIFICANT CHANGE UP (ref 3.3–5)
ALP SERPL-CCNC: 208 U/L — HIGH (ref 40–120)
ALT FLD-CCNC: 13 U/L — SIGNIFICANT CHANGE UP (ref 4–33)
ANION GAP SERPL CALC-SCNC: 17 MMOL/L — HIGH (ref 7–14)
APTT BLD: 24.3 SEC — LOW (ref 24.5–35.6)
AST SERPL-CCNC: 28 U/L — SIGNIFICANT CHANGE UP (ref 4–32)
BASOPHILS # BLD AUTO: 0.04 K/UL — SIGNIFICANT CHANGE UP (ref 0–0.2)
BASOPHILS NFR BLD AUTO: 0.2 % — SIGNIFICANT CHANGE UP (ref 0–2)
BILIRUB SERPL-MCNC: 0.5 MG/DL — SIGNIFICANT CHANGE UP (ref 0.2–1.2)
BUN SERPL-MCNC: 8 MG/DL — SIGNIFICANT CHANGE UP (ref 7–23)
CALCIUM SERPL-MCNC: 8.5 MG/DL — SIGNIFICANT CHANGE UP (ref 8.4–10.5)
CHLORIDE SERPL-SCNC: 96 MMOL/L — LOW (ref 98–107)
CO2 SERPL-SCNC: 16 MMOL/L — LOW (ref 22–31)
CREAT SERPL-MCNC: 1.48 MG/DL — HIGH (ref 0.5–1.3)
EGFR: 45 ML/MIN/1.73M2 — LOW
EOSINOPHIL # BLD AUTO: 0.06 K/UL — SIGNIFICANT CHANGE UP (ref 0–0.5)
EOSINOPHIL NFR BLD AUTO: 0.3 % — SIGNIFICANT CHANGE UP (ref 0–6)
FIBRINOGEN PPP-MCNC: 777 MG/DL — HIGH (ref 200–465)
GLUCOSE SERPL-MCNC: 140 MG/DL — HIGH (ref 70–99)
HCT VFR BLD CALC: 36.5 % — SIGNIFICANT CHANGE UP (ref 34.5–45)
HGB BLD-MCNC: 11.7 G/DL — SIGNIFICANT CHANGE UP (ref 11.5–15.5)
IANC: 18.14 K/UL — HIGH (ref 1.8–7.4)
IMM GRANULOCYTES NFR BLD AUTO: 0.8 % — SIGNIFICANT CHANGE UP (ref 0–0.9)
INR BLD: 0.84 RATIO — SIGNIFICANT CHANGE UP (ref 0.85–1.18)
LDH SERPL L TO P-CCNC: 294 U/L — HIGH (ref 135–225)
LYMPHOCYTES # BLD AUTO: 1.3 K/UL — SIGNIFICANT CHANGE UP (ref 1–3.3)
LYMPHOCYTES # BLD AUTO: 6.3 % — LOW (ref 13–44)
MAGNESIUM SERPL-MCNC: 5.8 MG/DL — HIGH (ref 1.6–2.6)
MAGNESIUM SERPL-MCNC: 6.8 MG/DL — HIGH (ref 1.6–2.6)
MAGNESIUM SERPL-MCNC: 6.9 MG/DL — HIGH (ref 1.6–2.6)
MAGNESIUM SERPL-MCNC: >9.7 MG/DL — CRITICAL HIGH (ref 1.6–2.6)
MCHC RBC-ENTMCNC: 26.8 PG — LOW (ref 27–34)
MCHC RBC-ENTMCNC: 32.1 GM/DL — SIGNIFICANT CHANGE UP (ref 32–36)
MCV RBC AUTO: 83.5 FL — SIGNIFICANT CHANGE UP (ref 80–100)
MONOCYTES # BLD AUTO: 1 K/UL — HIGH (ref 0–0.9)
MONOCYTES NFR BLD AUTO: 4.8 % — SIGNIFICANT CHANGE UP (ref 2–14)
NEUTROPHILS # BLD AUTO: 18.14 K/UL — HIGH (ref 1.8–7.4)
NEUTROPHILS NFR BLD AUTO: 87.6 % — HIGH (ref 43–77)
NRBC # BLD: 0 /100 WBCS — SIGNIFICANT CHANGE UP (ref 0–0)
NRBC # FLD: 0 K/UL — SIGNIFICANT CHANGE UP (ref 0–0)
PLATELET # BLD AUTO: 268 K/UL — SIGNIFICANT CHANGE UP (ref 150–400)
POTASSIUM SERPL-MCNC: 4 MMOL/L — SIGNIFICANT CHANGE UP (ref 3.5–5.3)
POTASSIUM SERPL-SCNC: 4 MMOL/L — SIGNIFICANT CHANGE UP (ref 3.5–5.3)
PROT SERPL-MCNC: 7.8 G/DL — SIGNIFICANT CHANGE UP (ref 6–8.3)
PROTHROM AB SERPL-ACNC: 9.5 SEC — SIGNIFICANT CHANGE UP (ref 9.5–13)
RBC # BLD: 4.37 M/UL — SIGNIFICANT CHANGE UP (ref 3.8–5.2)
RBC # FLD: 16.8 % — HIGH (ref 10.3–14.5)
SODIUM SERPL-SCNC: 129 MMOL/L — LOW (ref 135–145)
URATE SERPL-MCNC: 7.1 MG/DL — HIGH (ref 2.5–7)
WBC # BLD: 20.71 K/UL — HIGH (ref 3.8–10.5)
WBC # FLD AUTO: 20.71 K/UL — HIGH (ref 3.8–10.5)

## 2023-08-24 PROCEDURE — 59400 OBSTETRICAL CARE: CPT | Mod: U9,GC

## 2023-08-24 RX ORDER — TETANUS TOXOID, REDUCED DIPHTHERIA TOXOID AND ACELLULAR PERTUSSIS VACCINE, ADSORBED 5; 2.5; 8; 8; 2.5 [IU]/.5ML; [IU]/.5ML; UG/.5ML; UG/.5ML; UG/.5ML
0.5 SUSPENSION INTRAMUSCULAR ONCE
Refills: 0 | Status: DISCONTINUED | OUTPATIENT
Start: 2023-08-24 | End: 2023-08-26

## 2023-08-24 RX ORDER — OXYTOCIN 10 UNIT/ML
41.67 VIAL (ML) INJECTION
Qty: 20 | Refills: 0 | Status: DISCONTINUED | OUTPATIENT
Start: 2023-08-24 | End: 2023-08-26

## 2023-08-24 RX ORDER — PRAMOXINE HYDROCHLORIDE 150 MG/15G
1 AEROSOL, FOAM RECTAL EVERY 4 HOURS
Refills: 0 | Status: DISCONTINUED | OUTPATIENT
Start: 2023-08-24 | End: 2023-08-26

## 2023-08-24 RX ORDER — OXYCODONE HYDROCHLORIDE 5 MG/1
5 TABLET ORAL
Refills: 0 | Status: DISCONTINUED | OUTPATIENT
Start: 2023-08-24 | End: 2023-08-26

## 2023-08-24 RX ORDER — MAGNESIUM HYDROXIDE 400 MG/1
30 TABLET, CHEWABLE ORAL
Refills: 0 | Status: DISCONTINUED | OUTPATIENT
Start: 2023-08-24 | End: 2023-08-26

## 2023-08-24 RX ORDER — MAGNESIUM SULFATE 500 MG/ML
0.5 VIAL (ML) INJECTION
Qty: 40 | Refills: 0 | Status: DISCONTINUED | OUTPATIENT
Start: 2023-08-24 | End: 2023-08-24

## 2023-08-24 RX ORDER — NIFEDIPINE 30 MG
10 TABLET, EXTENDED RELEASE 24 HR ORAL ONCE
Refills: 0 | Status: COMPLETED | OUTPATIENT
Start: 2023-08-24 | End: 2023-08-24

## 2023-08-24 RX ORDER — ACETAMINOPHEN 500 MG
975 TABLET ORAL
Refills: 0 | Status: DISCONTINUED | OUTPATIENT
Start: 2023-08-24 | End: 2023-08-26

## 2023-08-24 RX ORDER — IBUPROFEN 200 MG
600 TABLET ORAL EVERY 6 HOURS
Refills: 0 | Status: COMPLETED | OUTPATIENT
Start: 2023-08-24 | End: 2024-07-22

## 2023-08-24 RX ORDER — SODIUM CHLORIDE 9 MG/ML
1000 INJECTION, SOLUTION INTRAVENOUS
Refills: 0 | Status: DISCONTINUED | OUTPATIENT
Start: 2023-08-24 | End: 2023-08-25

## 2023-08-24 RX ORDER — AER TRAVELER 0.5 G/1
1 SOLUTION RECTAL; TOPICAL EVERY 4 HOURS
Refills: 0 | Status: DISCONTINUED | OUTPATIENT
Start: 2023-08-24 | End: 2023-08-26

## 2023-08-24 RX ORDER — SIMETHICONE 80 MG/1
80 TABLET, CHEWABLE ORAL EVERY 4 HOURS
Refills: 0 | Status: DISCONTINUED | OUTPATIENT
Start: 2023-08-24 | End: 2023-08-26

## 2023-08-24 RX ORDER — DIPHENHYDRAMINE HCL 50 MG
25 CAPSULE ORAL EVERY 6 HOURS
Refills: 0 | Status: DISCONTINUED | OUTPATIENT
Start: 2023-08-24 | End: 2023-08-26

## 2023-08-24 RX ORDER — LANOLIN
1 OINTMENT (GRAM) TOPICAL EVERY 6 HOURS
Refills: 0 | Status: DISCONTINUED | OUTPATIENT
Start: 2023-08-24 | End: 2023-08-26

## 2023-08-24 RX ORDER — DIBUCAINE 1 %
1 OINTMENT (GRAM) RECTAL EVERY 6 HOURS
Refills: 0 | Status: DISCONTINUED | OUTPATIENT
Start: 2023-08-24 | End: 2023-08-26

## 2023-08-24 RX ORDER — OXYTOCIN 10 UNIT/ML
2 VIAL (ML) INJECTION
Qty: 30 | Refills: 0 | Status: DISCONTINUED | OUTPATIENT
Start: 2023-08-24 | End: 2023-08-26

## 2023-08-24 RX ORDER — NIFEDIPINE 30 MG
60 TABLET, EXTENDED RELEASE 24 HR ORAL DAILY
Refills: 0 | Status: DISCONTINUED | OUTPATIENT
Start: 2023-08-24 | End: 2023-08-25

## 2023-08-24 RX ORDER — NIFEDIPINE 30 MG
1 TABLET, EXTENDED RELEASE 24 HR ORAL
Qty: 0 | Refills: 0 | DISCHARGE
Start: 2023-08-24

## 2023-08-24 RX ORDER — KETOROLAC TROMETHAMINE 30 MG/ML
30 SYRINGE (ML) INJECTION ONCE
Refills: 0 | Status: DISCONTINUED | OUTPATIENT
Start: 2023-08-24 | End: 2023-08-24

## 2023-08-24 RX ORDER — BENZOCAINE 10 %
1 GEL (GRAM) MUCOUS MEMBRANE EVERY 6 HOURS
Refills: 0 | Status: DISCONTINUED | OUTPATIENT
Start: 2023-08-24 | End: 2023-08-26

## 2023-08-24 RX ORDER — LABETALOL HCL 100 MG
20 TABLET ORAL ONCE
Refills: 0 | Status: COMPLETED | OUTPATIENT
Start: 2023-08-24 | End: 2023-08-24

## 2023-08-24 RX ORDER — SODIUM CHLORIDE 9 MG/ML
3 INJECTION INTRAMUSCULAR; INTRAVENOUS; SUBCUTANEOUS EVERY 8 HOURS
Refills: 0 | Status: DISCONTINUED | OUTPATIENT
Start: 2023-08-24 | End: 2023-08-26

## 2023-08-24 RX ORDER — OXYCODONE HYDROCHLORIDE 5 MG/1
5 TABLET ORAL ONCE
Refills: 0 | Status: DISCONTINUED | OUTPATIENT
Start: 2023-08-24 | End: 2023-08-26

## 2023-08-24 RX ORDER — HYDROCORTISONE 1 %
1 OINTMENT (GRAM) TOPICAL EVERY 6 HOURS
Refills: 0 | Status: DISCONTINUED | OUTPATIENT
Start: 2023-08-24 | End: 2023-08-26

## 2023-08-24 RX ADMIN — Medication 10 MILLIGRAM(S): at 16:26

## 2023-08-24 RX ADMIN — Medication 12.5 GM/HR: at 22:00

## 2023-08-24 RX ADMIN — Medication 10 MILLIGRAM(S): at 15:52

## 2023-08-24 RX ADMIN — Medication 30 MILLIGRAM(S): at 15:32

## 2023-08-24 RX ADMIN — SODIUM CHLORIDE 3 MILLILITER(S): 9 INJECTION INTRAMUSCULAR; INTRAVENOUS; SUBCUTANEOUS at 22:00

## 2023-08-24 RX ADMIN — Medication 30 MILLIGRAM(S): at 18:39

## 2023-08-24 RX ADMIN — Medication 12.5 GM/HR: at 18:59

## 2023-08-24 RX ADMIN — Medication 20 MILLIGRAM(S): at 16:53

## 2023-08-24 NOTE — OB PROVIDER LABOR PROGRESS NOTE - NS_OBIHIFHRDETAILS_OBGYN_ALL_OB_FT
120/mod/+accels/intermittent variables
145/mod variability/+recurrent late decels despite repositoning
150/mod variability/+accels/+intermittent variable decels
Baseline: 135 bpm, moderate variability,  + accels, - decels
baseline 130, moderate, intermittent variable decels
135/mod/+accels/-decels
145 min-mod aubree/-accels/+ late decels
135/mod/+accels/-decels
135/mod/+accels/-decels
140/mod/+accels/-decels

## 2023-08-24 NOTE — OB POSTPARTUM EVENT NOTE - NS_EVENTSUMMARY1_OBGYN_ALL_OB_FT
Pt has been having high blood pressure since pushing unable to determine accuracy due to maternal exertion. Pt continued to have severe blood pressure post delivery.

## 2023-08-24 NOTE — OB RN DELIVERY SUMMARY - NSSELHIDDEN_OBGYN_ALL_OB_FT
[NS_DeliveryAttending1_OBGYN_ALL_OB_FT:[email or web address]=],[NS_DeliveryRN_OBGYN_ALL_OB_FT:[email or web address]]

## 2023-08-24 NOTE — DISCHARGE NOTE OB - CARE PROVIDER_API CALL
Everette Walters  Obstetrics and Gynecology  [address], [address]  [place] [address]  Phone: [phone]  Fax: [phone]  Follow Up Time:

## 2023-08-24 NOTE — OB PROVIDER LABOR PROGRESS NOTE - ASSESSMENT
AROM clear fluid  Continue pitocin  Pt comfortable with epidural    letitia SHEETS
Continue pitocin  Peanut ball    letitia SHEETS
Plan: 43y y/o  @ 38w1d in stable condition  - IUPC placed for contractions, continue to titrate pitocin  - Amnioinfusion started w/ 500cc bolus  - Continuous EFM, [place]  - Con't IVF    d/w attending physician Dr. Walters, at bedside   Nayely Gao MD  PGY-3
Pt is a 42yo  @38w1d admitted for IOL for sPEC now with cervical balloon in place.    - Continue cont EFM, toco, IVF  - Continue IOL with buccal cytotec  - Continue MgSO4 for sPEC    Reina Gallegos MD PGY1
Continue pitocin  Pt comfortable with epidural    letitia SHEETS
IOL sPEC/Mg, progressing in labor     -pt to get top off of anesthesia  -continue samuel Rodrigues, PGY4  D/w Dr. Guerrero
@38.1wks sPEC/Mg  Cervical balloon firmly in place  Patient repositioned to LL  Return in FHR baseline noted with position change  If decelerations persist consider discontinuing cervical balloon  Cont with intrauterine resuscitation  Cont EFM/TOCO  Will reassess PRN    alysia Garcia NP
Plan: 43y y/o  @ 38w2d in stable condition  - Pitocin paused, break for a few hours  - Continuous EFM, [place]  - Con't IVF    plan per attending Dr Romeo Gao MD  PGY-3  
Pt has continued to make progress throughout the day  Cervix significantly softer and more effaced  Fetal vertex now well engaged in the pelvis at -1  Continue pitocin  Pt comfortable with epidural    letitia SHEETS
Plan to start epidural pump  Replace cervical balloon  Start pitocin  For AROM when possible    letitia SHEETS

## 2023-08-24 NOTE — OB PROVIDER LABOR PROGRESS NOTE - NS_OBIHICONTRACTIONPATTERNDETAILS_OBGYN_ALL_OB_FT
irregular
Q3-4min
coupling noted
irregular
Q3-5min
q 2-3 min
toco Q 3-5min
q 2-3 min
intermittent variable decels
irregular

## 2023-08-24 NOTE — OB PROVIDER DELIVERY SUMMARY - NSPROVIDERDELIVERYNOTE_OBGYN_ALL_OB_FT
Called to the Labor room 16 to evaluate patient. Patient was also found to be FD and +2 station. Patient was instructed to push. Patient had an atraumatic  of a live male infant in OP with nuchal cord x2. Cord was clamped and cut and cord was obtained for cord gases. Infant was handed to the awaiting pediatricians, Apgars 7/8. The placenta delivered spontaneously and intact, 3 vessels noted. Upon inspection of the perineum, a 2nd degree laceration was noted and repaired with a 2-0 chromic suture. 1000mcg cytotec TN given for lower uterine segment atony, with improvement in tone. The baby and mother bonded well and the EBL 461cc.    Laps and sharp count were correct.    Baby name Klaudia

## 2023-08-24 NOTE — OB RN DELIVERY SUMMARY - NS_SEPSISRSKCALC_OBGYN_ALL_OB_FT
[de-identified] : no current pain. EOS calculated successfully. EOS Risk Factor: 0.5/1000 live births ([facility] national incidence); GA=38w2d; Temp=98.42; ROM=24.65; GBS='Negative'; Antibiotics='No antibiotics or any antibiotics < 2 hrs prior to birth'

## 2023-08-24 NOTE — DISCHARGE NOTE OB - HOSPITAL COURSE
Patient admitted for induction of labor for severe preeclampsia. She had a spontaneous vaginal delivery of a live female infant. Her postpartum course was uncomplicated.

## 2023-08-24 NOTE — DISCHARGE NOTE OB - CARE PLAN
1 Principal Discharge DX:	 (normal spontaneous vaginal delivery)  Assessment and plan of treatment:	Return to normal activities of daily living, blood pressure control   Principal Discharge DX:	 (normal spontaneous vaginal delivery)  Assessment and plan of treatment:	Return to normal activities of daily living, blood pressure control  Secondary Diagnosis:	Preeclampsia  Assessment and plan of treatment:	Monitor blood pressures 3x daily. Call OB office for 140/90mmHg or greater. Return to hospital for 160/110mmHg or greater. Monitor for symptoms of preeclampsia

## 2023-08-24 NOTE — CHART NOTE - NSCHARTNOTEFT_GEN_A_CORE
PA Note    patient seen & examined for placement of cervical balloon   comfortable s/p epidural    VS  T(C): 36.5 (08-23-23 @ 06:31)  HR: 88 (08-23-23 @ 09:55)  BP: 119/56 (08-23-23 @ 09:46)  RR: 18 (08-23-23 @ 06:31)  SpO2: 100% (08-23-23 @ 09:55)    /mod aubree/+accels/no decels  [place] q 4-7min  VE 1.5/50/-3 cook cervical balloon placed without complication - 60ccs instilled in both the uterine and vaginal balloons  patient tolerated well    cont efm/toco  plan to start pitocin  dw Dr Romeo ann
Pt seen at bedside d/t recurrent late decelerations. Pt and RN endorse pt recently changed position and decelerations then started. Pt reports feeling well. Denies rectal pressure. Comfortable with epidural. Pt repositioned and pitocin decreased from 24 to 12mu/min.     Vital Signs Last 24 Hrs  T(C): 36.6 (23 Aug 2023 14:32), Max: 36.8 (22 Aug 2023 21:01)  T(F): 97.88 (23 Aug 2023 14:32), Max: 98.24 (22 Aug 2023 21:01)  HR: 103 (23 Aug 2023 18:20) (75 - 121)  BP: 135/69 (23 Aug 2023 18:13) (80/44 - 177/77)  RR: 16 (23 Aug 2023 14:32) (14 - 18)  SpO2: 100% (23 Aug 2023 18:20) (91% - 100%)    Parameters below as of 22 Aug 2023 23:11  Patient On (Oxygen Delivery Method): room air    , minimal variability, +accels prior to repositioning, +late decels post repositioning, cat 2  TOCO: q3-5min    42y/o  at 38+1wks sPEC IOL on MgSO4 with cat 2 tracing which improves with resucitative efforts as described above.    Plan  -titrate pitocin per protocol when cat 1 x20 minutes  -continuous EFM/TOCO/IV fluids/MgSO4    Dr Walters updated  Nadja Finley CNM
ATT:  Pt examined  cervix- anterior lip, 100%, +1 station. FHR category I Will reposition position pt to allow complete dilation. Shweta Guerrero MD

## 2023-08-24 NOTE — DISCHARGE NOTE OB - MEDICATION SUMMARY - MEDICATIONS TO TAKE
I will START or STAY ON the medications listed below when I get home from the hospital:    ibuprofen 600 mg oral tablet  -- 1 tab(s) by mouth every 8 hours  with food   -- Do not take this drug if you are pregnant.  It is very important that you take or use this exactly as directed.  Do not skip doses or discontinue unless directed by your doctor.  May cause drowsiness or dizziness.  Obtain medical advice before taking any non-prescription drugs as some may affect the action of this medication.  Take with food or milk.    -- Indication: For Pain    NIFEdipine 30 mg oral tablet, extended release  -- 1 tab(s) by mouth once a day  -- Indication: For Preeclampsia

## 2023-08-24 NOTE — OB NEONATOLOGY/PEDIATRICIAN DELIVERY SUMMARY - NSPEDSNEONOTESA_OBGYN_ALL_OB_FT
Peds called to LDR for 38.2 wk AGA female born via  to a 42 y/o  mother.  Maternal history of HTN. Maternal labs include Blood Type A+, HIV - , RPR NR , Rubella I , Hep B - , GBS - 23. ROM at 13:38 on  with clear fluids (ROM hours: 25H).  Baby emerged stunned with intermediate tone and blue color with APGARS of 7/8 . Nuchal x1. Resuscitation included w/d/s/s with initiation of CPAP around 4 minutes of life. NICU fellow called and arrived during CPAP. CPAP was continued until 25 minutes of life and baby was trialed off for 3 minutes before resuming CPAP. CPAP continued until 44 minutes of life when baby was satting >95 and was transitioned to skin to skin. Mom plans to initiate breastfeeding, declines Hep B vaccine. Highest maternal temp: 36.7. EOS 0.14.    : 2023  TOB: 14:17  Weight: 3100g    Delivery attended by Dr. Niya Rivas, pediatrics

## 2023-08-24 NOTE — DISCHARGE NOTE OB - NS MD DC FALL RISK RISK
For information on Fall & Injury Prevention, visit: [email or web address] OR  [email or web address] OR  https://www.cdc.gov/steadi/patient.html

## 2023-08-24 NOTE — DISCHARGE NOTE OB - NS OB DC IMMUNIZATIONS MMR YN
[place] [address], [place] [address]  P [phone]  F [phone]    PATIENT:  Bassem West   : 1940  Referring physician:Stuart Copeland MD  CHIEF COMPLAINT:   Chief Complaint   Patient presents with   • Follow-up     6mo     HISTORY OF PRESENT ILLNESS:  Reji is a pleasant 80 year old male patient with a history of CAD s/p CABG, PAF, AAA, carotid stenosis, HTN, HLD, CKD, COPD, who is presenting for a cardiovascular follow-up.     Last seen 3/23/21. The patient was doing well at that time.     On 21 the patient presented to [facility] ED for a mechanical fall. He was walking his dog when he tripped over the dog leash and hit his head, he did not lose consciousness. CT head showed no acute pathology. Patient started ambulating with a cane since and started physical therapy on 6/10/21 for balance.    Patient returned to [facility] ED on 21 for left rib pain. Chest XR showed no rib fracture or pneumothorax or hemothorax. Tender over the left anterolateral ribs no obvious deformity, normal vital signs no hypoxia no splinting. Discharged lidocaine patch.     He is feeling well overall, and denies any new or worsening cardiovascular symptoms. The patient has been doing well from cardiac perspective. No chest pain, shortness of breath, orthopnea or PND. No dizziness or syncope. No palpitations. No lower extremity claudication. The patient is physically moderately active  and exercises 5 days a week without any significant symptoms at this time. Medication list reviewed, patient is compliant without side effects. Patient denies any cough, cold, fever, chills, or contact with any COVID positive patient.      PAST MEDICAL HISTORY:    Past Medical History:   Diagnosis Date   • Abdominal aortic aneurysm (AAA) (CMS/[facility])     endograft 11   • Abdominal aortic aneurysm (AAA) (CMS/[facility])     -3.2 CM-8-;-   • CAD in native artery     admitted with UA (10/16)-->angio  revealed critical LM disease, s/p CABGX4 with LIMA->LAD, SVG->OM1/SVG->OM2, SVG->L RADHA (10/4/16)   • Carotid stenosis      LICA, R CEA 2009   • Chest pain     found GIB/colon = polyps/hemorrhoids/gastric erosion (GI/[facility]juha) (12/16)   • CKD (chronic kidney disease) stage 3, GFR 30-59 ml/min (CMS/[facility]) 2/27/2019   • Condition not found     CAROTID [id number] TOTAL OCCLUSION;RT 50 -69% OCCLUSION   • Condition not found     also takes pumpkin seed oil   • COPD (chronic obstructive pulmonary disease) (CMS/[facility])     - >history of tobacco use in the past   • History of colonoscopy     -HYPERPLASTIC POLYP--DR LOZANO;5/2014--POLYP   • History of stress test     CARDIAC , 20 YRS [id number]   • HTN (hypertension)     2007   • Hx of CABG 2/27/2019   • Hyperlipidemia     FOR MANY YRS   • LLL pneumonia      5mm pulmonary nodule unchanged on CT (10/16) but new JOSIAH lesion noted 5mm on CT (10/16) - follows with Dr. Martin   • Lung nodule     CHEST CT STABLE FOR 2 YEARS   • PAF (paroxysmal atrial fibrillation) (CMS/HCC)      postop cabg; was on amiodarone but dev'd elevated amylase so d/c and maintained NSR on BB   • Peripheral vascular disease (CMS/HCC)     SUP FEMORAL LASER SURG-DR GARCIA   • Vision loss     AUGUSTINA PARTIAL ,20/200   • Weakness     found GIB/colon = polyps/hemorrhoids/gastric erosion (GI/Ajuha) (12/16)     REVIEW OF SYSTEMS:    Constitutional: Negative.    HENT: Negative.    Eyes: Negative.    Respiratory: Negative.    Cardiovascular:        SEE HPI   Endocrine: Negative.    Genitourinary: Negative.    Skin: Negative.    Allergic/Immunologic: Negative.    Neurological: Negative.    Hematological: Negative.    Psychiatric/Behavioral: Negative.      ALLERGIES:    ALLERGIES:   Allergen Reactions   • Niacin Other (See Comments) and HIVES     hot flashes   • Sulfa Antibiotics Other (See Comments) and HIVES     nauseated      MEDICATIONS:     Current Outpatient Medications   Medication Sig  Dispense Refill   • NIFEdipine CC (ADALAT CC) 90 MG 24 hr tablet TAKE 1 TABLET BY MOUTH DAILY 90 tablet 0   • metoPROLOL tartrate (LOPRESSOR) 50 MG tablet TAKE 1 TABLET BY MOUTH TWICE DAILY 180 tablet 1   • Ventolin  (90 Base) MCG/ACT inhaler INHALE 2 PUFFS INTO THE LUNGS EVERY 4 HOURS AS NEEDED FOR SHORTNESS OF BREATH OR WHEEZING 18 g 0   • atorvastatin (LIPITOR) 80 MG tablet TAKE 1 TABLET BY MOUTH EVERY NIGHT AT BEDTIME 90 tablet 1   • ezetimibe (ZETIA) 10 MG tablet TAKE 1 TABLET BY MOUTH DAILY 90 tablet 3   • Ascorbic Acid (vitamin C) 1000 MG tablet Take 1,000 mg by mouth daily.     • Lutein 6 MG Cap      • hydrALAZINE (APRESOLINE) 50 MG tablet Take 50 mg by mouth 3 times daily.     • cholecalciferol (VITAMIN D3) 1000 UNITS tablet 1 tablet Orally Once a day     • aspirin (ASPIR-81) 81 MG EC tablet 1 tablet Orally Once a day     • Multiple Vitamins Tab as directed Orally     • Coenzyme Q10 (COQ-10) 100 MG Cap 1 capsule with a meal Orally Once a day       No current facility-administered medications for this visit.     SOCIAL HISTORY:    Social History     Tobacco Use   • Smoking status: Former Smoker     Packs/day: 1.50     Types: Cigarettes     Quit date: 2004     Years since quittin.0   • Smokeless tobacco: Never Used   Vaping Use   • Vaping Use: never used   Substance Use Topics   • Alcohol use: Yes     Comment:  Rare alcohol use   • Drug use: No     FAMILY HISTORY:    Family History   Problem Relation Age of Onset   • Heart disease Mother    • Cancer Father    • Heart disease Father    • Other Brother         Positive for CAD   • Hyperlipidemia Sister    • Stroke Sister    • Diabetes Sister    • Myocardial Infarction Sister    • Other Sister         at 84-GLIOBLASTOMA MULTIFORM     PHYSICAL EXAMINATION:  BP (!) 160/72 (BP Location: LUE - Left upper extremity)   Pulse 60   Temp 97.4 °F (36.3 °C) (Temporal)   Resp 16   Ht 5' 8\" (1.727 m)   Wt 72.1 kg (159 lb)   BMI 24.18 kg/m²   BSA 1.85  m²   Constitutional: Oriented to person, place, and time. Appears well-developed and well-nourished.   HENT:   Head: Normocephalic and atraumatic.   Mouth/Throat: Oropharynx is clear and moist.   Neck: Normal range of motion. Neck supple.   Cardiovascular: Normal rate, regular rhythm, S1 normal, S2 normal and intact distal pulses. Exam reveals no S3, no S4 and no friction rub.   No murmur heard.  Pulmonary/Chest: Breath sounds normal. No wheezes. No rales.   Abdominal: Soft. Bowel sounds are normal. Exhibits no distension. There is no tenderness.   Musculoskeletal: Normal range of motion. Exhibits no edema or tenderness.   Neurological: Alert and oriented to person, place, and time.   Skin: Skin is warm and dry. No rash noted.   Psychiatric: Normal mood and affect. Behavior is normal.   Nursing note and vitals reviewed.    I personally reviewed and interpreted recent laboratory values in the chart.     CARDIAC TESTING/IMAGING  I have reviewed the pertinent imaging study reports. These are the pertinent findings:  · Labs - 4/7/21: , LDL 45, HDL 72, Cr 1.53, Hgb 12.8, K 4.5  · TTE - 10/1/16:  1. Normal left ventricular size and systolic function with a normal  estimated ejection fraction.  Mild diastolic dysfunction.  2. Mild left atrial enlargement  · CT abdomen - 3/6/19: No significant interval change when compared with the CT exam from 02/14/2018. Aorto-biiliac endoluminal stent graft, similar to prior exam. Mild distal abdominal aortic aneurysm, similar to prior exam. Atherosclerotic disease. No periaortic fluid collection. Bladder is distended. Mildly enlarged and heterogeneous prostate gland. No CT evidence of bowel obstruction. Moderate volume of stool in the colon. Diverticulosis.  No CT evidence of diverticulitis.  · CT Abd - 9/21/2020: no change from prior study, 3.5 cm,     ASSESSMENT/PLAN    1. CAD in native artery admitted with UA (10/16)-->angio revealed critical LM disease, s/p CABGX4 with  LIMA->LAD, SVG->OM1/SVG->OM2, SVG->L RADHA (10/4/16)    2. Abdominal aortic aneurysm (AAA) without rupture (CMS/[facility])    3. PAF (paroxysmal atrial fibrillation) (CMS/[facility])    4. Essential hypertension    5. Bilateral carotid artery stenosis    6. Stage 3a chronic kidney disease (CMS/[facility])    7. Chronic obstructive pulmonary disease, unspecified COPD type (CMS/[facility])    8. Mixed hyperlipidemia       CAD (coronary artery disease)   s/p CABG 11/16  - 2016 echo with normal LV size & function, no WMA, no LVH, no valvular issues  remains physically active, no symptoms concerning for angina  will notify us if any new limiting symptoms or decline in his functional capacity.     Abdominal aortic aneurysm, without rupture   CT 10/2016. Endograft in the aorta is well placed with no leakage. Resolution of his aneurysm noted.  CT 3/19 showed stable endograft  CT Abd 9/21/2020 without contrast showed no change from prior study, 3.5 cm  We will order the abdomen pelvis  CT scan without contrast    Paroxysmal atrial fibrillation   Resolved. hx of PAF post CABG 2016.  no recurrance Event monitor 12/17 was without PAF and anticoag was stopped     Hypertension   Whitecoat syndrome  - on bb/ccb/hydralazine, will continue the same   medications are being adjusted by nephrology (Dr. Andrade) who he is seeing for CKD.  BP well controlled at home with most readings generally running 120s/60s..  Advised to watch sodium consumption, < 2 g per day, diet and exercise and relaxation techniques discussed    Carotid disease, bilateral   s/p R CEA, known  of LICA  U/S 10/16 PARTH < 50%,  LICA  U/s 1/9 LICA , PARTH = < 50%  on asa/statin, no s/s if TIA/CVA     Fall  No further falls.  On 5/16/21 patient was walking his dog when he tripped over the dog leash and hit his head, he did not lose consciousness. CT head showed no acute pathology.   Follows with physical therapy.    CKD (chronic kidney disease) stage 3, GFR 30-59 ml/min   Follows up with  Dr Andrade.   Cr 1.53 on 4/7/21    COPD (chronic obstructive pulmonary disease)   also pulmonary nodules, Folllows up with dr REN Martin   pt denies any sob.     Pure hypercholesterolemia   LDL 45 from 4/7/21- below goal of <70 mg/dL. On high dose statin & Zetia 10 mg.   AST and ALT normal 4/7/21.  Diet and exercise discussed.       Others    Plan of care discussed with the patient. All questions were answered. Patient verbalized understanding and agrees with the plan. Primary care issues are being followed by the primary care physician. A letter has been sent to the referring physician.     Return in about 6 months (around 3/21/2022).    On 09/21/21, [place] WALLER scribed the services personally performed by Milad Burns MD    I have reviewed and revised the note above. The documentation recorded by the scribe accurately reflects history obtained, actions preformed and decisions made by me.     Milad Burns MD    No

## 2023-08-24 NOTE — DISCHARGE NOTE OB - PATIENT PORTAL LINK FT
You can access the FollowMyHealth Patient Portal offered by [facility] by registering at the following website: [email or web address]. By joining [facility]’s FollowMyHealth portal, you will also be able to view your health information using other applications (apps) compatible with our system.

## 2023-08-24 NOTE — OB PROVIDER LABOR PROGRESS NOTE - NSVAGINALEXAM_OBGYN_ALL_OB_DT
23-Aug-2023 08:19
24-Aug-2023 00:27
24-Aug-2023 02:20
24-Aug-2023 07:51
23-Aug-2023 20:47
23-Aug-2023 05:08
23-Aug-2023 21:36
23-Aug-2023 13:43
23-Aug-2023 00:48
23-Aug-2023 16:01

## 2023-08-24 NOTE — OB PROVIDER LABOR PROGRESS NOTE - NS_SUBJECTIVE/OBJECTIVE_OBGYN_ALL_OB_FT
Pt seen and examined for cervical change
Pt seen and examined for cervical change
Pt seen and evaluated at bedside  Intermittent cat 2 tracing overnight, tracing now cat 1
Assessed pt for c/o increased pain.
Pt seen and examined for cervical change
R3 Labor & Delivery Progress Note     Pt seen & examined at bedside for increased rectal pressure.    T(C): 36.7 (08-24-23 @ 02:30), Max: 36.8 (08-23-23 @ 21:30)  HR: 103 (08-24-23 @ 02:55) (75 - 127)  BP: 133/58 (08-24-23 @ 02:45) (80/44 - 177/77)  RR: 19 (08-24-23 @ 02:30) (14 - 19)  SpO2: 100% (08-24-23 @ 02:55) (90% - 100%)
R3 Labor & Delivery Progress Note     Pt seen & examined at bedside for intermittent decels.    T(C): 36.7 (08-23-23 @ 19:30), Max: 36.7 (08-23-23 @ 19:30)  HR: 120 (08-23-23 @ 21:32) (75 - 127)  BP: 157/68 (08-23-23 @ 21:32) (80/44 - 177/77)  RR: 17 (08-23-23 @ 19:30) (14 - 18)  SpO2: 100% (08-23-23 @ 21:30) (91% - 100%)
S:  Pt seen and examined for cervical balloon placement    O:  Vital Signs Last 24 Hrs  T(C): 36.5 (22 Aug 2023 23:11), Max: 36.8 (22 Aug 2023 21:01)  T(F): 97.7 (22 Aug 2023 23:11), Max: 98.24 (22 Aug 2023 21:01)  HR: 78 (23 Aug 2023 00:46) (77 - 90)  BP: 138/70 (23 Aug 2023 00:46) (115/69 - 171/77)  BP(mean): --  RR: 18 (22 Aug 2023 23:11) (16 - 18)  SpO2: 100% (23 Aug 2023 00:43) (94% - 100%)    Parameters below as of 22 Aug 2023 23:11  Patient On (Oxygen Delivery Method): room air
Patient seen and examined secondary to recurrent late decelerations.   VS  T(C): 36.5 (08-23-23 @ 04:35)  HR: 92 (08-23-23 @ 05:18)  BP: 117/56 (08-23-23 @ 05:16)  RR: 18 (08-23-23 @ 04:35)  SpO2: 100% (08-23-23 @ 05:13)
Pt seen and examined  Cervical balloon in the vagina   AROM clear fluid

## 2023-08-24 NOTE — DISCHARGE NOTE OB - PLAN OF CARE
Return to normal activities of daily living, blood pressure control Monitor blood pressures 3x daily. Call OB office for 140/90mmHg or greater. Return to hospital for 160/110mmHg or greater. Monitor for symptoms of preeclampsia

## 2023-08-25 ENCOUNTER — APPOINTMENT (OUTPATIENT)
Dept: ANTEPARTUM | Facility: CLINIC | Age: 43
End: 2023-08-25

## 2023-08-25 LAB
ALBUMIN SERPL ELPH-MCNC: 2.9 G/DL — LOW (ref 3.3–5)
ALP SERPL-CCNC: 156 U/L — HIGH (ref 40–120)
ALT FLD-CCNC: 13 U/L — SIGNIFICANT CHANGE UP (ref 4–33)
ANION GAP SERPL CALC-SCNC: 11 MMOL/L — SIGNIFICANT CHANGE UP (ref 7–14)
APTT BLD: 24.5 SEC — SIGNIFICANT CHANGE UP (ref 24.5–35.6)
AST SERPL-CCNC: 26 U/L — SIGNIFICANT CHANGE UP (ref 4–32)
BASOPHILS # BLD AUTO: 0.03 K/UL — SIGNIFICANT CHANGE UP (ref 0–0.2)
BASOPHILS NFR BLD AUTO: 0.2 % — SIGNIFICANT CHANGE UP (ref 0–2)
BILIRUB SERPL-MCNC: 0.2 MG/DL — SIGNIFICANT CHANGE UP (ref 0.2–1.2)
BUN SERPL-MCNC: 8 MG/DL — SIGNIFICANT CHANGE UP (ref 7–23)
CALCIUM SERPL-MCNC: 8.5 MG/DL — SIGNIFICANT CHANGE UP (ref 8.4–10.5)
CHLORIDE SERPL-SCNC: 100 MMOL/L — SIGNIFICANT CHANGE UP (ref 98–107)
CO2 SERPL-SCNC: 22 MMOL/L — SIGNIFICANT CHANGE UP (ref 22–31)
CREAT SERPL-MCNC: 0.67 MG/DL — SIGNIFICANT CHANGE UP (ref 0.5–1.3)
EGFR: 110 ML/MIN/1.73M2 — SIGNIFICANT CHANGE UP
EOSINOPHIL # BLD AUTO: 0.05 K/UL — SIGNIFICANT CHANGE UP (ref 0–0.5)
EOSINOPHIL NFR BLD AUTO: 0.3 % — SIGNIFICANT CHANGE UP (ref 0–6)
FIBRINOGEN PPP-MCNC: 772 MG/DL — HIGH (ref 200–465)
GLUCOSE SERPL-MCNC: 103 MG/DL — HIGH (ref 70–99)
HCT VFR BLD CALC: 30.4 % — LOW (ref 34.5–45)
HGB BLD-MCNC: 9.9 G/DL — LOW (ref 11.5–15.5)
IANC: 15.46 K/UL — HIGH (ref 1.8–7.4)
IMM GRANULOCYTES NFR BLD AUTO: 0.8 % — SIGNIFICANT CHANGE UP (ref 0–0.9)
INR BLD: 0.86 RATIO — SIGNIFICANT CHANGE UP (ref 0.85–1.18)
LDH SERPL L TO P-CCNC: 331 U/L — HIGH (ref 135–225)
LYMPHOCYTES # BLD AUTO: 13.5 % — SIGNIFICANT CHANGE UP (ref 13–44)
LYMPHOCYTES # BLD AUTO: 2.67 K/UL — SIGNIFICANT CHANGE UP (ref 1–3.3)
MAGNESIUM SERPL-MCNC: 4.2 MG/DL — HIGH (ref 1.6–2.6)
MAGNESIUM SERPL-MCNC: 5.5 MG/DL — HIGH (ref 1.6–2.6)
MCHC RBC-ENTMCNC: 26.2 PG — LOW (ref 27–34)
MCHC RBC-ENTMCNC: 32.6 GM/DL — SIGNIFICANT CHANGE UP (ref 32–36)
MCV RBC AUTO: 80.4 FL — SIGNIFICANT CHANGE UP (ref 80–100)
MONOCYTES # BLD AUTO: 1.38 K/UL — HIGH (ref 0–0.9)
MONOCYTES NFR BLD AUTO: 7 % — SIGNIFICANT CHANGE UP (ref 2–14)
NEUTROPHILS # BLD AUTO: 15.46 K/UL — HIGH (ref 1.8–7.4)
NEUTROPHILS NFR BLD AUTO: 78.2 % — HIGH (ref 43–77)
NRBC # BLD: 0 /100 WBCS — SIGNIFICANT CHANGE UP (ref 0–0)
NRBC # FLD: 0 K/UL — SIGNIFICANT CHANGE UP (ref 0–0)
PLATELET # BLD AUTO: 248 K/UL — SIGNIFICANT CHANGE UP (ref 150–400)
POTASSIUM SERPL-MCNC: 3.1 MMOL/L — LOW (ref 3.5–5.3)
POTASSIUM SERPL-SCNC: 3.1 MMOL/L — LOW (ref 3.5–5.3)
PROT SERPL-MCNC: 6.8 G/DL — SIGNIFICANT CHANGE UP (ref 6–8.3)
PROTHROM AB SERPL-ACNC: 9.7 SEC — SIGNIFICANT CHANGE UP (ref 9.5–13)
RBC # BLD: 3.78 M/UL — LOW (ref 3.8–5.2)
RBC # FLD: 16.8 % — HIGH (ref 10.3–14.5)
SODIUM SERPL-SCNC: 133 MMOL/L — LOW (ref 135–145)
URATE SERPL-MCNC: 7 MG/DL — SIGNIFICANT CHANGE UP (ref 2.5–7)
WBC # BLD: 19.74 K/UL — HIGH (ref 3.8–10.5)
WBC # FLD AUTO: 19.74 K/UL — HIGH (ref 3.8–10.5)

## 2023-08-25 RX ORDER — IBUPROFEN 200 MG
600 TABLET ORAL EVERY 6 HOURS
Refills: 0 | Status: DISCONTINUED | OUTPATIENT
Start: 2023-08-25 | End: 2023-08-26

## 2023-08-25 RX ORDER — SODIUM CHLORIDE 9 MG/ML
1000 INJECTION, SOLUTION INTRAVENOUS
Refills: 0 | Status: DISCONTINUED | OUTPATIENT
Start: 2023-08-25 | End: 2023-08-26

## 2023-08-25 RX ORDER — NIFEDIPINE 30 MG
60 TABLET, EXTENDED RELEASE 24 HR ORAL DAILY
Refills: 0 | Status: DISCONTINUED | OUTPATIENT
Start: 2023-08-25 | End: 2023-08-26

## 2023-08-25 RX ORDER — MAGNESIUM SULFATE 500 MG/ML
1 VIAL (ML) INJECTION
Qty: 40 | Refills: 0 | Status: DISCONTINUED | OUTPATIENT
Start: 2023-08-25 | End: 2023-08-26

## 2023-08-25 RX ADMIN — Medication 25 GM/HR: at 07:05

## 2023-08-25 RX ADMIN — Medication 600 MILLIGRAM(S): at 12:35

## 2023-08-25 RX ADMIN — Medication 600 MILLIGRAM(S): at 18:30

## 2023-08-25 RX ADMIN — Medication 1 TABLET(S): at 12:35

## 2023-08-25 RX ADMIN — Medication 600 MILLIGRAM(S): at 17:52

## 2023-08-25 RX ADMIN — Medication 600 MILLIGRAM(S): at 04:30

## 2023-08-25 RX ADMIN — Medication 600 MILLIGRAM(S): at 03:31

## 2023-08-25 RX ADMIN — SODIUM CHLORIDE 3 MILLILITER(S): 9 INJECTION INTRAMUSCULAR; INTRAVENOUS; SUBCUTANEOUS at 05:52

## 2023-08-25 NOTE — PROGRESS NOTE ADULT - ATTENDING COMMENTS
ATT:  Pt seen and examined by me today. I agree with findings, assessment and plan documented in resident note. Pt to complete magnesium course this afternoon. Otherwise doing well. Shweta Guerrero MD

## 2023-08-25 NOTE — PROVIDER CONTACT NOTE (OTHER) - ASSESSMENT
Systolic BP elevated, diastolic BP low. Patient denies headache, visual disturbances, N/V, epigastric Pain, shortness of breath, difficulty breathing, nor chestpain. Patient sitting comfortably in bed.

## 2023-08-26 VITALS
TEMPERATURE: 98 F | HEART RATE: 76 BPM | DIASTOLIC BLOOD PRESSURE: 60 MMHG | OXYGEN SATURATION: 100 % | SYSTOLIC BLOOD PRESSURE: 134 MMHG | RESPIRATION RATE: 18 BRPM

## 2023-08-26 RX ADMIN — Medication 600 MILLIGRAM(S): at 06:35

## 2023-08-26 RX ADMIN — Medication 600 MILLIGRAM(S): at 00:14

## 2023-08-26 RX ADMIN — Medication 600 MILLIGRAM(S): at 06:05

## 2023-08-26 RX ADMIN — Medication 600 MILLIGRAM(S): at 11:41

## 2023-08-26 RX ADMIN — SODIUM CHLORIDE 3 MILLILITER(S): 9 INJECTION INTRAMUSCULAR; INTRAVENOUS; SUBCUTANEOUS at 06:00

## 2023-08-26 RX ADMIN — Medication 1 TABLET(S): at 11:41

## 2023-08-26 RX ADMIN — Medication 600 MILLIGRAM(S): at 12:40

## 2023-08-26 RX ADMIN — Medication 600 MILLIGRAM(S): at 00:50

## 2023-08-26 NOTE — PROGRESS NOTE ADULT - ASSESSMENT
A/P: 42yo PPD#1 s/p  c/b sPEC.  Patient is stable and doing well post-partum.     #Postpartum  - Pain well controlled, continue current pain regimen  - Increase ambulation, SCDs when not ambulating  - Continue regular diet    #sPEC  -cw BP monitoring, BP: 126/59 (-23 @ 06:45) (126/46 - 149/57)  -HELLP wnl, P/C: 0.6  -Pt on Procardia 60 and MgSO4  -No PEC symptoms    Reina Gallegos MD PGY1
A/P: 44yo PPD#2 s/p  c/b sPEC.  Patient is stable and doing well post-partum.      #Postpartum  - Pain well controlled, continue current pain regimen  - Increase ambulation, SCDs when not ambulating  - Continue regular diet  - Discharge planning     #sPEC  -cw BP monitoring, BP: 149/58 (23 @ 02:00) (142/50 - 149/60)  -HELLP wnl, P/C: 0.6  -Pt on Procardia 60XL  -No PEC symptoms    Reina Gallegos MD PGY1

## 2023-08-26 NOTE — PROGRESS NOTE ADULT - SUBJECTIVE AND OBJECTIVE BOX
OB Progress Note:  PPD#2    S: 43yyo PPD#2 s/p  c/b sPEC on Procardia 60XL. Patient feels well. Pain is well controlled. She is tolerating a regular diet and passing flatus. She is voiding spontaneously, and ambulating without difficulty. Denies CP/SOB. Denies lightheadedness/dizziness. Denies N/V.    O:  Vitals:   Vital Signs Last 24 Hrs  T(C): 36.7 (26 Aug 2023 02:00), Max: 36.9 (25 Aug 2023 10:40)  T(F): 98 (26 Aug 2023 02:00), Max: 98.5 (25 Aug 2023 22:48)  HR: 91 (26 Aug 2023 02:00) (72 - 98)  BP: 149/58 (26 Aug 2023 02:00) (126/59 - 156/55)  BP(mean): --  RR: 18 (26 Aug 2023 02:00) (18 - 20)  SpO2: 99% (26 Aug 2023 02:00) (97% - 100%)    Parameters below as of 25 Aug 2023 14:20  Patient On (Oxygen Delivery Method): room air        MEDICATIONS  (STANDING):  acetaminophen     Tablet .. 975 milliGRAM(s) Oral <User Schedule>  diphtheria/tetanus/pertussis (acellular) Vaccine (Adacel) 0.5 milliLiter(s) IntraMuscular once  ibuprofen  Tablet. 600 milliGRAM(s) Oral every 6 hours  lactated ringers. 1000 milliLiter(s) (75 mL/Hr) IV Continuous <Continuous>  magnesium sulfate Infusion 1 Gm/Hr (25 mL/Hr) IV Continuous <Continuous>  NIFEdipine XL 60 milliGRAM(s) Oral daily  oxytocin Infusion 41.667 milliUNIT(s)/Min (125 mL/Hr) IV Continuous <Continuous>  oxytocin Infusion. 2 milliUNIT(s)/Min (2 mL/Hr) IV Continuous <Continuous>  prenatal multivitamin 1 Tablet(s) Oral daily  sodium chloride 0.9% lock flush 3 milliLiter(s) IV Push every 8 hours  sodium chloride 0.9%. 1000 milliLiter(s) (125 mL/Hr) IV Continuous <Continuous>    MEDICATIONS  (PRN):  benzocaine 20%/menthol 0.5% Spray 1 Spray(s) Topical every 6 hours PRN for Perineal discomfort  dibucaine 1% Ointment 1 Application(s) Topical every 6 hours PRN Perineal discomfort  diphenhydrAMINE 25 milliGRAM(s) Oral every 6 hours PRN Pruritus  hydrocortisone 1% Cream 1 Application(s) Topical every 6 hours PRN Moderate Pain (4-6)  lanolin Ointment 1 Application(s) Topical every 6 hours PRN nipple soreness  magnesium hydroxide Suspension 30 milliLiter(s) Oral two times a day PRN Constipation  oxyCODONE    IR 5 milliGRAM(s) Oral once PRN Moderate to Severe Pain (4-10)  oxyCODONE    IR 5 milliGRAM(s) Oral every 3 hours PRN Moderate to Severe Pain (4-10)  pramoxine 1%/zinc 5% Cream 1 Application(s) Topical every 4 hours PRN Moderate Pain (4-6)  simethicone 80 milliGRAM(s) Chew every 4 hours PRN Gas  witch hazel Pads 1 Application(s) Topical every 4 hours PRN Perineal discomfort      Labs:  Blood type: A Positive  Rubella IgG: RPR: Negative                          9.9<L>   19.74<H> >-----------< 248    (  @ 06:44 )             30.4<L>                        11.7   20.71<H> >-----------< 268    (  @ 11:00 )             36.5                        11.2<L>   15.59<H> >-----------< 267    (  @ 22:50 )             35.0                        11.0<L>   14.26<H> >-----------< 246    (  @ 10:30 )             33.8<L>    23 @ 06:44      133<L>  |  100  |  8   ----------------------------<  103<H>  3.1<L>   |  22  |  0.67    23 @ 11:00      129<L>  |  96<L>  |  8   ----------------------------<  140<H>  4.0   |  16<L>  |  1.48<H>    23 @ 22:50      132<L>  |  98  |  5<L>  ----------------------------<  110<H>  3.9   |  16<L>  |  0.60    23 @ 10:30      131<L>  |  101  |  7   ----------------------------<  100<H>  4.2   |  18<L>  |  0.63        Ca    8.5      25 Aug 2023 06:44  Ca    8.5      24 Aug 2023 11:00  Ca    8.1<L>      23 Aug 2023 22:50  Ca    8.5      23 Aug 2023 10:30  Mg     4.20<H>     08-25  Mg     5.50<H>     08-25  Mg     6.80<H>     08-24  Mg     >9.70<HH>     08-24  Mg     6.90<H>     08-24  Mg     5.80<H>     08-23  Mg     6.00<H>     08-23  Mg     6.10<H>     -23    TPro  6.8  /  Alb  2.9<L>  /  TBili  0.2  /  DBili  x   /  AST  26  /  ALT  13  /  AlkPhos  156<H>  23 @ 06:44  TPro  7.8  /  Alb  3.5  /  TBili  0.5  /  DBili  x   /  AST  28  /  ALT  13  /  AlkPhos  208<H>  23 @ 11:00  TPro  7.4  /  Alb  3.4  /  TBili  0.5  /  DBili  x   /  AST  25  /  ALT  14  /  AlkPhos  194<H>  23 @ 22:50  TPro  7.2  /  Alb  3.3  /  TBili  0.3  /  DBili  x   /  AST  24  /  ALT  12  /  AlkPhos  176<H>  23 @ 10:30          Physical Exam:  General: NAD  Abdomen: soft, non-tender, non-distended, fundus firm  Vaginal: Lochia wnl  Extremities: No erythema/edema    
Patient seen and examined at bedside, resting comfortably in no acute distress. Denies fever, chills, nausea or vomiting. She is tolerating a regular diet. She is ambulating without difficulty and voiding spontaneously. Pain is well controlled. Bleeding is less than a normal menses.    Physical Examination:  Vital Signs: Vital Signs Last 24 Hrs  T(C): 36.7 (26 Aug 2023 06:12), Max: 36.9 (25 Aug 2023 10:40)  T(F): 98 (26 Aug 2023 06:12), Max: 98.5 (25 Aug 2023 22:48)  HR: 80 (26 Aug 2023 06:12) (72 - 91)  BP: 136/52 (26 Aug 2023 06:12) (134/58 - 156/55)  BP(mean): --  RR: 18 (26 Aug 2023 06:12) (18 - 18)  SpO2: 99% (26 Aug 2023 06:12) (97% - 100%)    Parameters below as of 26 Aug 2023 06:12  Patient On (Oxygen Delivery Method): room air      General: alert and oriented, no acute distress  Cardio: regular rate and rhythm  Respiratory: non labored respirations  Abdomen: soft, non-tender, non-distended, uterus firm, palpated below the umbilicus  VE: minimal lochia noted  Musculoskeletal: No erythema/edema, no calf tenderness bilaterally    MEDICATIONS  (STANDING):  acetaminophen     Tablet .. 975 milliGRAM(s) Oral <User Schedule>  diphtheria/tetanus/pertussis (acellular) Vaccine (Adacel) 0.5 milliLiter(s) IntraMuscular once  ibuprofen  Tablet. 600 milliGRAM(s) Oral every 6 hours  lactated ringers. 1000 milliLiter(s) (75 mL/Hr) IV Continuous <Continuous>  magnesium sulfate Infusion 1 Gm/Hr (25 mL/Hr) IV Continuous <Continuous>  NIFEdipine XL 60 milliGRAM(s) Oral daily  oxytocin Infusion 41.667 milliUNIT(s)/Min (125 mL/Hr) IV Continuous <Continuous>  oxytocin Infusion. 2 milliUNIT(s)/Min (2 mL/Hr) IV Continuous <Continuous>  prenatal multivitamin 1 Tablet(s) Oral daily  sodium chloride 0.9% lock flush 3 milliLiter(s) IV Push every 8 hours  sodium chloride 0.9%. 1000 milliLiter(s) (125 mL/Hr) IV Continuous <Continuous>      Labs:  Blood type: A Positive  Rubella IgG: RPR: Negative                          9.9<L>   19.74<H> >-----------< 248    (  @ 06:44 )             30.4<L>                        11.7   20.71<H> >-----------< 268    (  @ 11:00 )             36.5                        11.2<L>   15.59<H> >-----------< 267    (  @ 22:50 )             35.0                        11.0<L>   14.26<H> >-----------< 246    (  @ 10:30 )             33.8<L>    23 @ 06:44      133<L>  |  100  |  8   ----------------------------<  103<H>  3.1<L>   |  22  |  0.67    23 @ 11:00      129<L>  |  96<L>  |  8   ----------------------------<  140<H>  4.0   |  16<L>  |  1.48<H>    23 @ 22:50      132<L>  |  98  |  5<L>  ----------------------------<  110<H>  3.9   |  16<L>  |  0.60    23 @ 10:30      131<L>  |  101  |  7   ----------------------------<  100<H>  4.2   |  18<L>  |  0.63        Ca    8.5      25 Aug 2023 06:44  Ca    8.5      24 Aug 2023 11:00  Ca    8.1<L>      23 Aug 2023 22:50  Ca    8.5      23 Aug 2023 10:30  Mg     4.20<H>     25  Mg     5.50<H>     08-25  Mg     6.80<H>     08-24  Mg     >9.70<HH>     08-24  Mg     6.90<H>     08-24  Mg     5.80<H>     08-  Mg     6.00<H>     08-  Mg     6.10<H>     08    TPro  6.8  /  Alb  2.9<L>  /  TBili  0.2  /  DBili  x   /  AST  26  /  ALT  13  /  AlkPhos  156<H>  23 @ 06:44  TPro  7.8  /  Alb  3.5  /  TBili  0.5  /  DBili  x   /  AST  28  /  ALT  13  /  AlkPhos  208<H>  23 @ 11:00  TPro  7.4  /  Alb  3.4  /  TBili  0.5  /  DBili  x   /  AST  25  /  ALT  14  /  AlkPhos  194<H>  23 @ 22:50  TPro  7.2  /  Alb  3.3  /  TBili  0.3  /  DBili  x   /  AST  24  /  ALT  12  /  AlkPhos  176<H>  23 @ 10:30      Assessment and Plan:   42yo s/p  on  now PPD#2.  Patient is stable and doing well post-partum.   Blood type: A+    PEC with severe features  - s/p mag sulfate  - not on anti HTN currently  - HELLP labs wnl, BPs mild range  - continue to monitor    Plan:  - VS per unit protocol  - SCDs for DVT ppx  - Regular diet  - Pain well controlled, continue current pain regimen  - Encourage ambulation  - Continue BP monitoring  - Discharge instructions reviewed  - D/c planning initiated, patient to follow up in office in 1 week for BP check and 6 weeks for post partum visit    Derick Spring MD
ANESTHESIA POSTOP CHECK    43y (1980) Female POSTOP DAY 1   T(C): 36.7 (08-25-23 @ 14:20)  HR: 90 (08-25-23 @ 14:41)  BP: 137/50 (08-25-23 @ 14:41) (126/46 - 156/55)  RR: 18 (08-25-23 @ 14:41) (14 - 22)  SpO2: 100% (08-25-23 @ 14:41)        NO APPARENT ANESTHESIA COMPLICATIONS      
OB Progress Note:  PPD#1    S: 42yo  PPD#1 s/p  c/b sPEC. Patient feels well. Pain is well controlled. She is tolerating a regular diet and passing flatus. She is voiding spontaneously, and ambulating without difficulty. Denies CP/SOB. Denies lightheadedness/dizziness. Denies N/V.    O:  Vitals:  Vital Signs Last 24 Hrs  T(C): 36.7 (25 Aug 2023 04:45), Max: 36.9 (24 Aug 2023 20:30)  T(F): 98 (25 Aug 2023 04:45), Max: 98.4 (24 Aug 2023 20:30)  HR: 84 (25 Aug 2023 04:45) (84 - 124)  BP: 130/62 (25 Aug 2023 04:45) (117/56 - 225/89)  BP(mean): --  RR: 14 (25 Aug 2023 04:45) (14 - 22)  SpO2: 99% (25 Aug 2023 04:45) (81% - 100%)        MEDICATIONS  (STANDING):  acetaminophen     Tablet .. 975 milliGRAM(s) Oral <User Schedule>  diphtheria/tetanus/pertussis (acellular) Vaccine (Adacel) 0.5 milliLiter(s) IntraMuscular once  ibuprofen  Tablet. 600 milliGRAM(s) Oral every 6 hours  lactated ringers. 1000 milliLiter(s) (75 mL/Hr) IV Continuous <Continuous>  magnesium sulfate Infusion 1 Gm/Hr (25 mL/Hr) IV Continuous <Continuous>  NIFEdipine XL 60 milliGRAM(s) Oral daily  oxytocin Infusion 41.667 milliUNIT(s)/Min (125 mL/Hr) IV Continuous <Continuous>  oxytocin Infusion. 2 milliUNIT(s)/Min (2 mL/Hr) IV Continuous <Continuous>  prenatal multivitamin 1 Tablet(s) Oral daily  sodium chloride 0.9% lock flush 3 milliLiter(s) IV Push every 8 hours  sodium chloride 0.9%. 1000 milliLiter(s) (125 mL/Hr) IV Continuous <Continuous>      Labs:  Blood type: A Positive  Rubella IgG: RPR: Negative                          11.7   20.71<H> >-----------< 268    (  @ 11:00 )             36.5                        11.2<L>   15.59<H> >-----------< 267    (  @ 22:50 )             35.0                        11.0<L>   14.26<H> >-----------< 246    (  @ 10:30 )             33.8<L>                        11.6   13.02<H> >-----------< 250    (  @ 21:50 )             36.4    23 @ 11:00      129<L>  |  96<L>  |  8   ----------------------------<  140<H>  4.0   |  16<L>  |  1.48<H>    23 @ 22:50      132<L>  |  98  |  5<L>  ----------------------------<  110<H>  3.9   |  16<L>  |  0.60    23 @ 10:30      131<L>  |  101  |  7   ----------------------------<  100<H>  4.2   |  18<L>  |  0.63    23 @ 21:50      134<L>  |  101  |  7   ----------------------------<  115<H>  4.0   |  18<L>  |  0.55        Ca    8.5      24 Aug 2023 11:00  Ca    8.1<L>      23 Aug 2023 22:50  Ca    8.5      23 Aug 2023 10:30  Ca    9.5      22 Aug 2023 21:50  Mg     5.50<H>     08-25  Mg     6.80<H>     08-24  Mg     >9.70<HH>     08-24  Mg     6.90<H>     08-24  Mg     5.80<H>     08-23  Mg     6.00<H>     08-23  Mg     6.10<H>     08-23  Mg     4.80<H>     08-23    TPro  7.8  /  Alb  3.5  /  TBili  0.5  /  DBili  x   /  AST  28  /  ALT  13  /  AlkPhos  208<H>  23 @ 11:00  TPro  7.4  /  Alb  3.4  /  TBili  0.5  /  DBili  x   /  AST  25  /  ALT  14  /  AlkPhos  194<H>  23 @ 22:50  TPro  7.2  /  Alb  3.3  /  TBili  0.3  /  DBili  x   /  AST  24  /  ALT  12  /  AlkPhos  176<H>  23 @ 10:30  TPro  7.7  /  Alb  3.4  /  TBili  0.2  /  DBili  x   /  AST  24  /  ALT  12  /  AlkPhos  190<H>  23 @ 21:50          Physical Exam:  General: NAD  Abdomen: soft, non-tender, non-distended, fundus firm  Vaginal: Lochia wnl  Extremities: No erythema/edema

## 2023-08-27 ENCOUNTER — TRANSCRIPTION ENCOUNTER (OUTPATIENT)
Age: 43
End: 2023-08-27

## 2023-08-27 ENCOUNTER — INPATIENT (INPATIENT)
Facility: HOSPITAL | Age: 43
LOS: 2 days | Discharge: HOME | End: 2023-08-30
Attending: STUDENT IN AN ORGANIZED HEALTH CARE EDUCATION/TRAINING PROGRAM | Admitting: STUDENT IN AN ORGANIZED HEALTH CARE EDUCATION/TRAINING PROGRAM
Payer: MEDICAID

## 2023-08-27 VITALS
HEART RATE: 86 BPM | TEMPERATURE: 98 F | RESPIRATION RATE: 15 BRPM | SYSTOLIC BLOOD PRESSURE: 144 MMHG | DIASTOLIC BLOOD PRESSURE: 62 MMHG | OXYGEN SATURATION: 99 %

## 2023-08-27 DIAGNOSIS — O26.899 OTHER SPECIFIED PREGNANCY RELATED CONDITIONS, UNSPECIFIED TRIMESTER: ICD-10-CM

## 2023-08-27 DIAGNOSIS — Z96.641 PRESENCE OF RIGHT ARTIFICIAL HIP JOINT: Chronic | ICD-10-CM

## 2023-08-27 DIAGNOSIS — Z98.890 OTHER SPECIFIED POSTPROCEDURAL STATES: Chronic | ICD-10-CM

## 2023-08-27 LAB
ALBUMIN SERPL ELPH-MCNC: 3.2 G/DL — LOW (ref 3.3–5)
ALP SERPL-CCNC: 123 U/L — HIGH (ref 40–120)
ALT FLD-CCNC: 56 U/L — HIGH (ref 4–33)
ANION GAP SERPL CALC-SCNC: 14 MMOL/L — SIGNIFICANT CHANGE UP (ref 7–14)
ANISOCYTOSIS BLD QL: SLIGHT — SIGNIFICANT CHANGE UP
APTT BLD: 26.2 SEC — SIGNIFICANT CHANGE UP (ref 24.5–35.6)
AST SERPL-CCNC: 101 U/L — HIGH (ref 4–32)
BASOPHILS # BLD AUTO: 0 K/UL — SIGNIFICANT CHANGE UP (ref 0–0.2)
BASOPHILS NFR BLD AUTO: 0 % — SIGNIFICANT CHANGE UP (ref 0–2)
BILIRUB SERPL-MCNC: 0.2 MG/DL — SIGNIFICANT CHANGE UP (ref 0.2–1.2)
BLD GP AB SCN SERPL QL: NEGATIVE — SIGNIFICANT CHANGE UP
BUN SERPL-MCNC: 10 MG/DL — SIGNIFICANT CHANGE UP (ref 7–23)
CALCIUM SERPL-MCNC: 9.4 MG/DL — SIGNIFICANT CHANGE UP (ref 8.4–10.5)
CHLORIDE SERPL-SCNC: 101 MMOL/L — SIGNIFICANT CHANGE UP (ref 98–107)
CO2 SERPL-SCNC: 24 MMOL/L — SIGNIFICANT CHANGE UP (ref 22–31)
CREAT SERPL-MCNC: 0.85 MG/DL — SIGNIFICANT CHANGE UP (ref 0.5–1.3)
EGFR: 87 ML/MIN/1.73M2 — SIGNIFICANT CHANGE UP
EOSINOPHIL # BLD AUTO: 0.11 K/UL — SIGNIFICANT CHANGE UP (ref 0–0.5)
EOSINOPHIL NFR BLD AUTO: 0.9 % — SIGNIFICANT CHANGE UP (ref 0–6)
FIBRINOGEN PPP-MCNC: 587 MG/DL — HIGH (ref 200–465)
GLUCOSE SERPL-MCNC: 100 MG/DL — HIGH (ref 70–99)
HCT VFR BLD CALC: 32.1 % — LOW (ref 34.5–45)
HGB BLD-MCNC: 10.2 G/DL — LOW (ref 11.5–15.5)
HYPOCHROMIA BLD QL: SLIGHT — SIGNIFICANT CHANGE UP
IANC: 8.05 K/UL — HIGH (ref 1.8–7.4)
INR BLD: <0.9 RATIO — LOW (ref 0.85–1.18)
LDH SERPL L TO P-CCNC: 377 U/L — HIGH (ref 135–225)
LYMPHOCYTES # BLD AUTO: 1.84 K/UL — SIGNIFICANT CHANGE UP (ref 1–3.3)
LYMPHOCYTES # BLD AUTO: 14.8 % — SIGNIFICANT CHANGE UP (ref 13–44)
MANUAL SMEAR VERIFICATION: SIGNIFICANT CHANGE UP
MCHC RBC-ENTMCNC: 26.6 PG — LOW (ref 27–34)
MCHC RBC-ENTMCNC: 31.8 GM/DL — LOW (ref 32–36)
MCV RBC AUTO: 83.8 FL — SIGNIFICANT CHANGE UP (ref 80–100)
MICROCYTES BLD QL: SLIGHT — SIGNIFICANT CHANGE UP
MONOCYTES # BLD AUTO: 0.86 K/UL — SIGNIFICANT CHANGE UP (ref 0–0.9)
MONOCYTES NFR BLD AUTO: 6.9 % — SIGNIFICANT CHANGE UP (ref 2–14)
NEUTROPHILS # BLD AUTO: 9.28 K/UL — HIGH (ref 1.8–7.4)
NEUTROPHILS NFR BLD AUTO: 74.8 % — SIGNIFICANT CHANGE UP (ref 43–77)
PLAT MORPH BLD: ABNORMAL
PLATELET # BLD AUTO: 296 K/UL — SIGNIFICANT CHANGE UP (ref 150–400)
PLATELET COUNT - ESTIMATE: NORMAL — SIGNIFICANT CHANGE UP
POLYCHROMASIA BLD QL SMEAR: SLIGHT — SIGNIFICANT CHANGE UP
POTASSIUM SERPL-MCNC: 3.8 MMOL/L — SIGNIFICANT CHANGE UP (ref 3.5–5.3)
POTASSIUM SERPL-SCNC: 3.8 MMOL/L — SIGNIFICANT CHANGE UP (ref 3.5–5.3)
PROT SERPL-MCNC: 7.1 G/DL — SIGNIFICANT CHANGE UP (ref 6–8.3)
PROTHROM AB SERPL-ACNC: 9.2 SEC — LOW (ref 9.5–13)
RBC # BLD: 3.83 M/UL — SIGNIFICANT CHANGE UP (ref 3.8–5.2)
RBC # FLD: 16.4 % — HIGH (ref 10.3–14.5)
RBC BLD AUTO: SIGNIFICANT CHANGE UP
RH IG SCN BLD-IMP: POSITIVE — SIGNIFICANT CHANGE UP
SODIUM SERPL-SCNC: 139 MMOL/L — SIGNIFICANT CHANGE UP (ref 135–145)
URATE SERPL-MCNC: 6.4 MG/DL — SIGNIFICANT CHANGE UP (ref 2.5–7)
VARIANT LYMPHS # BLD: 2.6 % — SIGNIFICANT CHANGE UP (ref 0–6)
WBC # BLD: 12.41 K/UL — HIGH (ref 3.8–10.5)
WBC # FLD AUTO: 12.41 K/UL — HIGH (ref 3.8–10.5)

## 2023-08-27 RX ORDER — MAGNESIUM SULFATE 500 MG/ML
4 VIAL (ML) INJECTION ONCE
Refills: 0 | Status: COMPLETED | OUTPATIENT
Start: 2023-08-27 | End: 2023-08-27

## 2023-08-27 RX ORDER — SODIUM CHLORIDE 9 MG/ML
1000 INJECTION, SOLUTION INTRAVENOUS
Refills: 0 | Status: DISCONTINUED | OUTPATIENT
Start: 2023-08-27 | End: 2023-08-29

## 2023-08-27 RX ORDER — MAGNESIUM SULFATE 500 MG/ML
2 VIAL (ML) INJECTION
Qty: 40 | Refills: 0 | Status: DISCONTINUED | OUTPATIENT
Start: 2023-08-27 | End: 2023-08-29

## 2023-08-27 RX ORDER — MAGNESIUM SULFATE 500 MG/ML
2 VIAL (ML) INJECTION
Qty: 40 | Refills: 0 | Status: DISCONTINUED | OUTPATIENT
Start: 2023-08-27 | End: 2023-08-27

## 2023-08-27 RX ORDER — IBUPROFEN 200 MG
600 TABLET ORAL EVERY 6 HOURS
Refills: 0 | Status: DISCONTINUED | OUTPATIENT
Start: 2023-08-27 | End: 2023-08-30

## 2023-08-27 RX ORDER — NIFEDIPINE 30 MG
30 TABLET, EXTENDED RELEASE 24 HR ORAL DAILY
Refills: 0 | Status: DISCONTINUED | OUTPATIENT
Start: 2023-08-27 | End: 2023-08-28

## 2023-08-27 RX ADMIN — SODIUM CHLORIDE 50 MILLILITER(S): 9 INJECTION, SOLUTION INTRAVENOUS at 22:42

## 2023-08-27 RX ADMIN — Medication 600 MILLIGRAM(S): at 23:41

## 2023-08-27 RX ADMIN — Medication 300 GRAM(S): at 22:27

## 2023-08-27 RX ADMIN — Medication 50 GM/HR: at 22:52

## 2023-08-27 NOTE — H&P ADULT - HISTORY OF PRESENT ILLNESS
44 yo ,  @38 weeks on 23 secondary to PEC with severe features, was discharged home yesterday presented to D&T with complaint of elevated blood pressure @ home 230P 154/72, 5P 154/60 and 8P 180/60, Pt report that she did not take procardia while she was in the hospital but took it the first time today at noon. Pt report slight HA 2/10, did not take pain medication. Pt denies visual changes and epigastric pain.   Prenatal care with Dr. Walters    Meds: PNV, procardia 30 xl po daily  Allergies: NKDA

## 2023-08-27 NOTE — OB POSTPARTUM TRIAGE NOTE - NS_OBGYNHISTORY_OBGYN_ALL_OB_FT
ft  08 wt. 7lbs 15oz  miscarriage x1 w/ d&c  GYN hx: denies hx of abnormal papsmear/cysts/fibroids/STDs ft  08 wt. 7lbs 15oz PEC   ft  2023 PEC F 6-13 complicated with PEC was on Mag and discharged home with procardia 30 mg po daily  miscarriage x1 w/ d&c  GYN hx: denies hx of abnormal papsmear/cysts/fibroids/STDs

## 2023-08-27 NOTE — OB POSTPARTUM TRIAGE NOTE - NS_PRENATALHARD_OBGYN_ALL_OB
H&P reviewed  After examining the patient I find no changes in the patients condition since the H&P had been written  To OR for Right Total Knee Arthroplasty  Discussed plan with patient    Will follow up post-op Available

## 2023-08-27 NOTE — OB POSTPARTUM TRIAGE NOTE - HISTORY OF PRESENT ILLNESS
44 yo , EGA@38 weeks, presented to D&T from the MD office with c/o elevated blood pressure from the office of 140/80's, Pt report that last weeks her blood pressure was also elevated for the first time in the pregnancy. mild HA today pain 4/10, also C/O of contractions irregular, every 7-8mns pain 4/10 denies vaginal bleeding, leakage of fluid, and reports fetal movement.  Denies changes in vision, chest pain, palpitations, shortness of breath.    Prenatal care with Dr. Walters  Prenatal course is uncomplicated.    GBS status is negative 23    Meds: PNV, ASA 81mg po daily stopped a couple weeks ago.   Allergies: NKDA     42 yo ,  @38 weeks on 23 secondary to PEC with severe features, was discharged home yesterday presented to D&T with complaint of elevated blood pressure @ home 230P 154/72, 5P 154/60 and 8P 180/60, Pt report that she did not take procardia while she was in the hospital but took it the first time today at noon. Pt report slight HA 2/10, did not take pain medication. Pt denies visual changes and epigastric pain.   Prenatal care with Dr. Walters    Meds: PNV, procardia 30 xl po daily  Allergies: NKDA

## 2023-08-27 NOTE — OB POSTPARTUM TRIAGE NOTE - NSOBPROVIDERNOTE_OBGYN_ALL_OB_FT
42 yo ,  @38 weeks on 23 secondary to PEC with severe features.   Discuss with Viivenne.   Pt to be admitted for PEC   for magnesium sulfate drip  procardia 30Xl po daily  routine orders.   blood consent signed.     ELIZABETH alvarez NP 42 yo ,  @38 weeks on 23 secondary to PEC with severe features.   Discuss with Cesar.   Labs reviewed with Dr. Guerrero  Pt to be admitted for PEC   for magnesium sulfate drip  procardia 30Xl po daily  routine orders.   blood consent signed.     ELIZABETH alvarez NP

## 2023-08-27 NOTE — OB POSTPARTUM TRIAGE NOTE - NSHPPHYSICALEXAM_GEN_ALL_CORE
GENERAL: no weakness, no fever/chills, no weight loss/gain  EYES/ENT: No visual changes, no vertigo or throat pain  NECK: No pain or stiffness   RESPIRATORY: no cough, no wheezing, no hemoptysis, no dyspnea, no shortness of breath  CARDIOVASCULAR: no chest pain or palpitations  GASTROINTESTINAL: no n/v/d, no abdominal or epigastric pain  GENITOURINARY: no dysuria, no frequency, no nocturia, no hematuria  MUSCULOSKELETAL: no trauma, no sprain/strain, no myalgias, no arthralgias, no fracture  NEUROLOGICAL: slight HA 2/10 pt refused pain medication at this time, no dizziness, no weakness, no numbness  SKIN: No itching, rashes

## 2023-08-27 NOTE — OB POSTPARTUM TRIAGE NOTE - NSHPLABSRESULTS_GEN_ALL_CORE
CBC Full  -  ( 27 Aug 2023 20:45 )  WBC Count : 12.41 K/uL  RBC Count : 3.83 M/uL  Hemoglobin : 10.2 g/dL  Hematocrit : 32.1 %  Platelet Count - Automated : 296 K/uL  Mean Cell Volume : 83.8 fL  Mean Cell Hemoglobin : 26.6 pg  Mean Cell Hemoglobin Concentration : 31.8 gm/dL  Auto Neutrophil # : 9.28 K/uL  Auto Lymphocyte # : 1.84 K/uL  Auto Monocyte # : 0.86 K/uL  Auto Eosinophil # : 0.11 K/uL  Auto Basophil # : 0.00 K/uL  Auto Neutrophil % : 74.8 %  Auto Lymphocyte % : 14.8 %  Auto Monocyte % : 6.9 %  Auto Eosinophil % : 0.9 %  Auto Basophil % : 0.0 %  PT/INR - ( 27 Aug 2023 20:45 )   PT: 9.2 sec;   INR: <0.90 ratio         PTT - ( 27 Aug 2023 20:45 )  PTT:26.2 sec  fibrinogen 587  08-27    139  |  101  |  10  ----------------------------<  100<H>  3.8   |  24  |  0.85    Ca    9.4      27 Aug 2023 20:45    TPro  7.1  /  Alb  3.2<L>  /  TBili  0.2  /  DBili  x   /  AST  101<H>  /  ALT  56<H>  /  AlkPhos  123<H>  08-27

## 2023-08-27 NOTE — H&P ADULT - ASSESSMENT
42 yo ,  @38 weeks on 23 secondary to PEC with severe features.   Discuss with Cesar.   Labs reviewed with Dr. Guerrero  Pt to be admitted for PEC   for magnesium sulfate drip  procardia 30Xl po daily  routine orders.   blood consent signed.     ELIZABETH alvarez NP

## 2023-08-28 DIAGNOSIS — O14.90 UNSPECIFIED PRE-ECLAMPSIA, UNSPECIFIED TRIMESTER: ICD-10-CM

## 2023-08-28 PROBLEM — Z78.9 OTHER SPECIFIED HEALTH STATUS: Chronic | Status: ACTIVE | Noted: 2023-08-22

## 2023-08-28 LAB
ALBUMIN SERPL ELPH-MCNC: 3.3 G/DL — SIGNIFICANT CHANGE UP (ref 3.3–5)
ALBUMIN SERPL ELPH-MCNC: 3.4 G/DL — SIGNIFICANT CHANGE UP (ref 3.3–5)
ALP SERPL-CCNC: 124 U/L — HIGH (ref 40–120)
ALP SERPL-CCNC: 124 U/L — HIGH (ref 40–120)
ALT FLD-CCNC: 66 U/L — HIGH (ref 4–33)
ALT FLD-CCNC: 66 U/L — HIGH (ref 4–33)
ANION GAP SERPL CALC-SCNC: 12 MMOL/L — SIGNIFICANT CHANGE UP (ref 7–14)
ANION GAP SERPL CALC-SCNC: 14 MMOL/L — SIGNIFICANT CHANGE UP (ref 7–14)
APTT BLD: 25.9 SEC — SIGNIFICANT CHANGE UP (ref 24.5–35.6)
APTT BLD: 26.1 SEC — SIGNIFICANT CHANGE UP (ref 24.5–35.6)
APTT BLD: 26.1 SEC — SIGNIFICANT CHANGE UP (ref 24.5–35.6)
AST SERPL-CCNC: 112 U/L — HIGH (ref 4–32)
AST SERPL-CCNC: 98 U/L — HIGH (ref 4–32)
BASOPHILS # BLD AUTO: 0.06 K/UL — SIGNIFICANT CHANGE UP (ref 0–0.2)
BASOPHILS # BLD AUTO: 0.06 K/UL — SIGNIFICANT CHANGE UP (ref 0–0.2)
BASOPHILS # BLD AUTO: 0.07 K/UL — SIGNIFICANT CHANGE UP (ref 0–0.2)
BASOPHILS NFR BLD AUTO: 0.5 % — SIGNIFICANT CHANGE UP (ref 0–2)
BASOPHILS NFR BLD AUTO: 0.5 % — SIGNIFICANT CHANGE UP (ref 0–2)
BASOPHILS NFR BLD AUTO: 0.7 % — SIGNIFICANT CHANGE UP (ref 0–2)
BILIRUB SERPL-MCNC: <0.2 MG/DL — SIGNIFICANT CHANGE UP (ref 0.2–1.2)
BILIRUB SERPL-MCNC: <0.2 MG/DL — SIGNIFICANT CHANGE UP (ref 0.2–1.2)
BUN SERPL-MCNC: 8 MG/DL — SIGNIFICANT CHANGE UP (ref 7–23)
BUN SERPL-MCNC: 9 MG/DL — SIGNIFICANT CHANGE UP (ref 7–23)
CALCIUM SERPL-MCNC: 8.3 MG/DL — LOW (ref 8.4–10.5)
CALCIUM SERPL-MCNC: 8.5 MG/DL — SIGNIFICANT CHANGE UP (ref 8.4–10.5)
CHLORIDE SERPL-SCNC: 100 MMOL/L — SIGNIFICANT CHANGE UP (ref 98–107)
CHLORIDE SERPL-SCNC: 98 MMOL/L — SIGNIFICANT CHANGE UP (ref 98–107)
CO2 SERPL-SCNC: 22 MMOL/L — SIGNIFICANT CHANGE UP (ref 22–31)
CO2 SERPL-SCNC: 25 MMOL/L — SIGNIFICANT CHANGE UP (ref 22–31)
CREAT SERPL-MCNC: 0.63 MG/DL — SIGNIFICANT CHANGE UP (ref 0.5–1.3)
CREAT SERPL-MCNC: 0.66 MG/DL — SIGNIFICANT CHANGE UP (ref 0.5–1.3)
EGFR: 111 ML/MIN/1.73M2 — SIGNIFICANT CHANGE UP
EGFR: 112 ML/MIN/1.73M2 — SIGNIFICANT CHANGE UP
EOSINOPHIL # BLD AUTO: 0.2 K/UL — SIGNIFICANT CHANGE UP (ref 0–0.5)
EOSINOPHIL # BLD AUTO: 0.22 K/UL — SIGNIFICANT CHANGE UP (ref 0–0.5)
EOSINOPHIL # BLD AUTO: 0.23 K/UL — SIGNIFICANT CHANGE UP (ref 0–0.5)
EOSINOPHIL NFR BLD AUTO: 1.6 % — SIGNIFICANT CHANGE UP (ref 0–6)
EOSINOPHIL NFR BLD AUTO: 2 % — SIGNIFICANT CHANGE UP (ref 0–6)
EOSINOPHIL NFR BLD AUTO: 2.1 % — SIGNIFICANT CHANGE UP (ref 0–6)
FIBRINOGEN PPP-MCNC: 541 MG/DL — HIGH (ref 200–465)
FIBRINOGEN PPP-MCNC: 571 MG/DL — HIGH (ref 200–465)
FIBRINOGEN PPP-MCNC: 586 MG/DL — HIGH (ref 200–465)
GLUCOSE SERPL-MCNC: 117 MG/DL — HIGH (ref 70–99)
GLUCOSE SERPL-MCNC: 142 MG/DL — HIGH (ref 70–99)
HCT VFR BLD CALC: 32.5 % — LOW (ref 34.5–45)
HCT VFR BLD CALC: 33.1 % — LOW (ref 34.5–45)
HCT VFR BLD CALC: 33.8 % — LOW (ref 34.5–45)
HGB BLD-MCNC: 10.5 G/DL — LOW (ref 11.5–15.5)
HGB BLD-MCNC: 10.7 G/DL — LOW (ref 11.5–15.5)
HGB BLD-MCNC: 10.7 G/DL — LOW (ref 11.5–15.5)
IANC: 7.48 K/UL — HIGH (ref 1.8–7.4)
IANC: 7.55 K/UL — HIGH (ref 1.8–7.4)
IANC: 8.91 K/UL — HIGH (ref 1.8–7.4)
IMM GRANULOCYTES NFR BLD AUTO: 2.8 % — HIGH (ref 0–0.9)
IMM GRANULOCYTES NFR BLD AUTO: 3.2 % — HIGH (ref 0–0.9)
IMM GRANULOCYTES NFR BLD AUTO: 3.9 % — HIGH (ref 0–0.9)
INR BLD: <0.9 RATIO — LOW (ref 0.85–1.18)
LDH SERPL L TO P-CCNC: 382 U/L — HIGH (ref 135–225)
LDH SERPL L TO P-CCNC: 391 U/L — HIGH (ref 135–225)
LYMPHOCYTES # BLD AUTO: 1.8 K/UL — SIGNIFICANT CHANGE UP (ref 1–3.3)
LYMPHOCYTES # BLD AUTO: 17 % — SIGNIFICANT CHANGE UP (ref 13–44)
LYMPHOCYTES # BLD AUTO: 18 % — SIGNIFICANT CHANGE UP (ref 13–44)
LYMPHOCYTES # BLD AUTO: 19.8 % — SIGNIFICANT CHANGE UP (ref 13–44)
LYMPHOCYTES # BLD AUTO: 2.23 K/UL — SIGNIFICANT CHANGE UP (ref 1–3.3)
LYMPHOCYTES # BLD AUTO: 2.25 K/UL — SIGNIFICANT CHANGE UP (ref 1–3.3)
MAGNESIUM SERPL-MCNC: 5.6 MG/DL — HIGH (ref 1.6–2.6)
MAGNESIUM SERPL-MCNC: 5.9 MG/DL — HIGH (ref 1.6–2.6)
MAGNESIUM SERPL-MCNC: 6.1 MG/DL — HIGH (ref 1.6–2.6)
MCHC RBC-ENTMCNC: 26.6 PG — LOW (ref 27–34)
MCHC RBC-ENTMCNC: 26.7 PG — LOW (ref 27–34)
MCHC RBC-ENTMCNC: 26.9 PG — LOW (ref 27–34)
MCHC RBC-ENTMCNC: 31.7 GM/DL — LOW (ref 32–36)
MCHC RBC-ENTMCNC: 32.3 GM/DL — SIGNIFICANT CHANGE UP (ref 32–36)
MCHC RBC-ENTMCNC: 32.3 GM/DL — SIGNIFICANT CHANGE UP (ref 32–36)
MCV RBC AUTO: 82.5 FL — SIGNIFICANT CHANGE UP (ref 80–100)
MCV RBC AUTO: 83.1 FL — SIGNIFICANT CHANGE UP (ref 80–100)
MCV RBC AUTO: 84.1 FL — SIGNIFICANT CHANGE UP (ref 80–100)
MONOCYTES # BLD AUTO: 0.7 K/UL — SIGNIFICANT CHANGE UP (ref 0–0.9)
MONOCYTES # BLD AUTO: 0.74 K/UL — SIGNIFICANT CHANGE UP (ref 0–0.9)
MONOCYTES # BLD AUTO: 0.74 K/UL — SIGNIFICANT CHANGE UP (ref 0–0.9)
MONOCYTES NFR BLD AUTO: 5.9 % — SIGNIFICANT CHANGE UP (ref 2–14)
MONOCYTES NFR BLD AUTO: 6.6 % — SIGNIFICANT CHANGE UP (ref 2–14)
MONOCYTES NFR BLD AUTO: 6.6 % — SIGNIFICANT CHANGE UP (ref 2–14)
NEUTROPHILS # BLD AUTO: 7.48 K/UL — HIGH (ref 1.8–7.4)
NEUTROPHILS # BLD AUTO: 7.55 K/UL — HIGH (ref 1.8–7.4)
NEUTROPHILS # BLD AUTO: 8.91 K/UL — HIGH (ref 1.8–7.4)
NEUTROPHILS NFR BLD AUTO: 67.2 % — SIGNIFICANT CHANGE UP (ref 43–77)
NEUTROPHILS NFR BLD AUTO: 70.4 % — SIGNIFICANT CHANGE UP (ref 43–77)
NEUTROPHILS NFR BLD AUTO: 71.2 % — SIGNIFICANT CHANGE UP (ref 43–77)
NRBC # BLD: 0 /100 WBCS — SIGNIFICANT CHANGE UP (ref 0–0)
NRBC # FLD: 0.04 K/UL — HIGH (ref 0–0)
NRBC # FLD: 0.05 K/UL — HIGH (ref 0–0)
NRBC # FLD: 0.06 K/UL — HIGH (ref 0–0)
PLATELET # BLD AUTO: 308 K/UL — SIGNIFICANT CHANGE UP (ref 150–400)
PLATELET # BLD AUTO: 308 K/UL — SIGNIFICANT CHANGE UP (ref 150–400)
PLATELET # BLD AUTO: 323 K/UL — SIGNIFICANT CHANGE UP (ref 150–400)
POTASSIUM SERPL-MCNC: 3.6 MMOL/L — SIGNIFICANT CHANGE UP (ref 3.5–5.3)
POTASSIUM SERPL-MCNC: 3.9 MMOL/L — SIGNIFICANT CHANGE UP (ref 3.5–5.3)
POTASSIUM SERPL-SCNC: 3.6 MMOL/L — SIGNIFICANT CHANGE UP (ref 3.5–5.3)
POTASSIUM SERPL-SCNC: 3.9 MMOL/L — SIGNIFICANT CHANGE UP (ref 3.5–5.3)
PROT SERPL-MCNC: 7.1 G/DL — SIGNIFICANT CHANGE UP (ref 6–8.3)
PROT SERPL-MCNC: 7.3 G/DL — SIGNIFICANT CHANGE UP (ref 6–8.3)
PROTHROM AB SERPL-ACNC: 8.9 SEC — LOW (ref 9.5–13)
PROTHROM AB SERPL-ACNC: 9 SEC — LOW (ref 9.5–13)
PROTHROM AB SERPL-ACNC: 9 SEC — LOW (ref 9.5–13)
RBC # BLD: 3.91 M/UL — SIGNIFICANT CHANGE UP (ref 3.8–5.2)
RBC # BLD: 4.01 M/UL — SIGNIFICANT CHANGE UP (ref 3.8–5.2)
RBC # BLD: 4.02 M/UL — SIGNIFICANT CHANGE UP (ref 3.8–5.2)
RBC # FLD: 16.6 % — HIGH (ref 10.3–14.5)
RBC # FLD: 16.7 % — HIGH (ref 10.3–14.5)
RBC # FLD: 16.9 % — HIGH (ref 10.3–14.5)
SODIUM SERPL-SCNC: 135 MMOL/L — SIGNIFICANT CHANGE UP (ref 135–145)
SODIUM SERPL-SCNC: 136 MMOL/L — SIGNIFICANT CHANGE UP (ref 135–145)
URATE SERPL-MCNC: 5.3 MG/DL — SIGNIFICANT CHANGE UP (ref 2.5–7)
URATE SERPL-MCNC: 6.1 MG/DL — SIGNIFICANT CHANGE UP (ref 2.5–7)
WBC # BLD: 10.61 K/UL — HIGH (ref 3.8–10.5)
WBC # BLD: 11.25 K/UL — HIGH (ref 3.8–10.5)
WBC # BLD: 12.51 K/UL — HIGH (ref 3.8–10.5)
WBC # FLD AUTO: 10.61 K/UL — HIGH (ref 3.8–10.5)
WBC # FLD AUTO: 11.25 K/UL — HIGH (ref 3.8–10.5)
WBC # FLD AUTO: 12.51 K/UL — HIGH (ref 3.8–10.5)

## 2023-08-28 PROCEDURE — 99232 SBSQ HOSP IP/OBS MODERATE 35: CPT | Mod: GC

## 2023-08-28 RX ORDER — HEPARIN SODIUM 5000 [USP'U]/ML
5000 INJECTION INTRAVENOUS; SUBCUTANEOUS EVERY 12 HOURS
Refills: 0 | Status: DISCONTINUED | OUTPATIENT
Start: 2023-08-28 | End: 2023-08-30

## 2023-08-28 RX ORDER — NIFEDIPINE 30 MG
60 TABLET, EXTENDED RELEASE 24 HR ORAL DAILY
Refills: 0 | Status: DISCONTINUED | OUTPATIENT
Start: 2023-08-29 | End: 2023-08-30

## 2023-08-28 RX ORDER — NIFEDIPINE 30 MG
30 TABLET, EXTENDED RELEASE 24 HR ORAL ONCE
Refills: 0 | Status: COMPLETED | OUTPATIENT
Start: 2023-08-28 | End: 2023-08-28

## 2023-08-28 RX ADMIN — SODIUM CHLORIDE 50 MILLILITER(S): 9 INJECTION, SOLUTION INTRAVENOUS at 19:45

## 2023-08-28 RX ADMIN — Medication 50 GM/HR: at 19:45

## 2023-08-28 RX ADMIN — Medication 600 MILLIGRAM(S): at 00:15

## 2023-08-28 RX ADMIN — Medication 600 MILLIGRAM(S): at 20:15

## 2023-08-28 RX ADMIN — Medication 30 MILLIGRAM(S): at 12:44

## 2023-08-28 RX ADMIN — Medication 50 GM/HR: at 07:47

## 2023-08-28 RX ADMIN — HEPARIN SODIUM 5000 UNIT(S): 5000 INJECTION INTRAVENOUS; SUBCUTANEOUS at 17:04

## 2023-08-28 RX ADMIN — Medication 30 MILLIGRAM(S): at 05:33

## 2023-08-28 RX ADMIN — Medication 600 MILLIGRAM(S): at 17:37

## 2023-08-28 RX ADMIN — Medication 50 GM/HR: at 06:33

## 2023-08-28 NOTE — PATIENT PROFILE ADULT - HEALTH LITERACY
Additional Notes: Recommended counseling for anxiety and mental health along with psychiatric visits.
no

## 2023-08-28 NOTE — CHART NOTE - NSCHARTNOTEFT_GEN_A_CORE
BPs elevated to 150s systolic this AM. Pt continues MgSo4. Will Increase procardia dose from 30mg XL-> 60mg XL.    ICU Vital Signs Last 24 Hrs  T(C): 36.5 (28 Aug 2023 06:45), Max: 36.8 (28 Aug 2023 03:00)  T(F): 97.7 (28 Aug 2023 06:45), Max: 98.2 (28 Aug 2023 03:00)  HR: 81 (28 Aug 2023 06:45) (81 - 105)  BP: 150/61 (28 Aug 2023 11:30) (133/53 - 154/55)  BP(mean): 81 (28 Aug 2023 11:30) (70 - 93)  ABP: --  ABP(mean): --  RR: 16 (28 Aug 2023 09:30) (15 - 18)  SpO2: 100% (28 Aug 2023 09:30) (96% - 100%)    O2 Parameters below as of 28 Aug 2023 06:45  Patient On (Oxygen Delivery Method): room air      d/w Dr Surinder Dalton [facility]

## 2023-08-28 NOTE — PATIENT PROFILE ADULT - DO YOU FEEL UNSAFE AT HOME, WORK, OR SCHOOL?
Notification Instructions: Patient will be notified of biopsy results. However, patient instructed to call the office if not contacted within 2 weeks. no

## 2023-08-29 ENCOUNTER — TRANSCRIPTION ENCOUNTER (OUTPATIENT)
Age: 43
End: 2023-08-29

## 2023-08-29 LAB
ALBUMIN SERPL ELPH-MCNC: 3.1 G/DL — LOW (ref 3.3–5)
ALP SERPL-CCNC: 113 U/L — SIGNIFICANT CHANGE UP (ref 40–120)
ALT FLD-CCNC: 63 U/L — HIGH (ref 4–33)
ANION GAP SERPL CALC-SCNC: 12 MMOL/L — SIGNIFICANT CHANGE UP (ref 7–14)
APTT BLD: 24.9 SEC — SIGNIFICANT CHANGE UP (ref 24.5–35.6)
AST SERPL-CCNC: 77 U/L — HIGH (ref 4–32)
BASOPHILS # BLD AUTO: 0.07 K/UL — SIGNIFICANT CHANGE UP (ref 0–0.2)
BASOPHILS NFR BLD AUTO: 0.6 % — SIGNIFICANT CHANGE UP (ref 0–2)
BILIRUB SERPL-MCNC: <0.2 MG/DL — SIGNIFICANT CHANGE UP (ref 0.2–1.2)
BUN SERPL-MCNC: 9 MG/DL — SIGNIFICANT CHANGE UP (ref 7–23)
CALCIUM SERPL-MCNC: 8.4 MG/DL — SIGNIFICANT CHANGE UP (ref 8.4–10.5)
CHLORIDE SERPL-SCNC: 100 MMOL/L — SIGNIFICANT CHANGE UP (ref 98–107)
CO2 SERPL-SCNC: 23 MMOL/L — SIGNIFICANT CHANGE UP (ref 22–31)
CREAT SERPL-MCNC: 0.63 MG/DL — SIGNIFICANT CHANGE UP (ref 0.5–1.3)
EGFR: 112 ML/MIN/1.73M2 — SIGNIFICANT CHANGE UP
EOSINOPHIL # BLD AUTO: 0.15 K/UL — SIGNIFICANT CHANGE UP (ref 0–0.5)
EOSINOPHIL NFR BLD AUTO: 1.4 % — SIGNIFICANT CHANGE UP (ref 0–6)
FIBRINOGEN PPP-MCNC: 543 MG/DL — HIGH (ref 200–465)
GLUCOSE SERPL-MCNC: 113 MG/DL — HIGH (ref 70–99)
HCT VFR BLD CALC: 34.5 % — SIGNIFICANT CHANGE UP (ref 34.5–45)
HGB BLD-MCNC: 10.8 G/DL — LOW (ref 11.5–15.5)
IANC: 7.56 K/UL — HIGH (ref 1.8–7.4)
IMM GRANULOCYTES NFR BLD AUTO: 2.5 % — HIGH (ref 0–0.9)
INR BLD: <0.9 RATIO — LOW (ref 0.85–1.18)
LDH SERPL L TO P-CCNC: 380 U/L — HIGH (ref 135–225)
LYMPHOCYTES # BLD AUTO: 19.2 % — SIGNIFICANT CHANGE UP (ref 13–44)
LYMPHOCYTES # BLD AUTO: 2.1 K/UL — SIGNIFICANT CHANGE UP (ref 1–3.3)
MCHC RBC-ENTMCNC: 26.1 PG — LOW (ref 27–34)
MCHC RBC-ENTMCNC: 31.3 GM/DL — LOW (ref 32–36)
MCV RBC AUTO: 83.3 FL — SIGNIFICANT CHANGE UP (ref 80–100)
MONOCYTES # BLD AUTO: 0.8 K/UL — SIGNIFICANT CHANGE UP (ref 0–0.9)
MONOCYTES NFR BLD AUTO: 7.3 % — SIGNIFICANT CHANGE UP (ref 2–14)
NEUTROPHILS # BLD AUTO: 7.56 K/UL — HIGH (ref 1.8–7.4)
NEUTROPHILS NFR BLD AUTO: 69 % — SIGNIFICANT CHANGE UP (ref 43–77)
NRBC # BLD: 0 /100 WBCS — SIGNIFICANT CHANGE UP (ref 0–0)
NRBC # FLD: 0 K/UL — SIGNIFICANT CHANGE UP (ref 0–0)
PLATELET # BLD AUTO: 283 K/UL — SIGNIFICANT CHANGE UP (ref 150–400)
POTASSIUM SERPL-MCNC: 4 MMOL/L — SIGNIFICANT CHANGE UP (ref 3.5–5.3)
POTASSIUM SERPL-SCNC: 4 MMOL/L — SIGNIFICANT CHANGE UP (ref 3.5–5.3)
PROT SERPL-MCNC: 7.1 G/DL — SIGNIFICANT CHANGE UP (ref 6–8.3)
PROTHROM AB SERPL-ACNC: 9.2 SEC — LOW (ref 9.5–13)
RBC # BLD: 4.14 M/UL — SIGNIFICANT CHANGE UP (ref 3.8–5.2)
RBC # FLD: 17 % — HIGH (ref 10.3–14.5)
SODIUM SERPL-SCNC: 135 MMOL/L — SIGNIFICANT CHANGE UP (ref 135–145)
URATE SERPL-MCNC: 5.2 MG/DL — SIGNIFICANT CHANGE UP (ref 2.5–7)
WBC # BLD: 10.95 K/UL — HIGH (ref 3.8–10.5)
WBC # FLD AUTO: 10.95 K/UL — HIGH (ref 3.8–10.5)

## 2023-08-29 PROCEDURE — 99232 SBSQ HOSP IP/OBS MODERATE 35: CPT | Mod: GC

## 2023-08-29 RX ORDER — IBUPROFEN 200 MG
1 TABLET ORAL
Qty: 0 | Refills: 0 | DISCHARGE
Start: 2023-08-29

## 2023-08-29 RX ORDER — LABETALOL HCL 100 MG
200 TABLET ORAL
Refills: 0 | Status: DISCONTINUED | OUTPATIENT
Start: 2023-08-29 | End: 2023-08-30

## 2023-08-29 RX ORDER — NIFEDIPINE 30 MG
1 TABLET, EXTENDED RELEASE 24 HR ORAL
Qty: 30 | Refills: 0
Start: 2023-08-29 | End: 2023-09-27

## 2023-08-29 RX ADMIN — HEPARIN SODIUM 5000 UNIT(S): 5000 INJECTION INTRAVENOUS; SUBCUTANEOUS at 11:05

## 2023-08-29 RX ADMIN — HEPARIN SODIUM 5000 UNIT(S): 5000 INJECTION INTRAVENOUS; SUBCUTANEOUS at 21:58

## 2023-08-29 RX ADMIN — Medication 200 MILLIGRAM(S): at 18:06

## 2023-08-29 RX ADMIN — Medication 60 MILLIGRAM(S): at 06:26

## 2023-08-29 NOTE — DISCHARGE NOTE OB - PATIENT PORTAL LINK FT
You can access the FollowMyHealth Patient Portal offered by [facility] by registering at the following website: [email or web address]. By joining [employer]’s FollowMyHealth portal, you will also be able to view your health information using other applications (apps) compatible with our system.

## 2023-08-29 NOTE — DISCHARGE NOTE PROVIDER - NSDCMRMEDTOKEN_GEN_ALL_CORE_FT
ibuprofen 600 mg oral tablet: 1 tab(s) orally every 6 hours As needed Moderate Pain (4 - 6)  Procardia XL 60 mg oral tablet, extended release: 1 tab(s) orally once a day Hold if BP &lt;110/60.   ibuprofen 600 mg oral tablet: 1 tab(s) orally every 6 hours As needed Moderate Pain (4 - 6)  labetalol 200 mg oral tablet: 1 tab(s) orally 2 times a day  Procardia XL 60 mg oral tablet, extended release: 1 tab(s) orally once a day Hold if BP &lt;110/60.

## 2023-08-29 NOTE — DISCHARGE NOTE PROVIDER - CARE PROVIDER_API CALL
José Valenzuela  Obstetrics and Gynecology  [address]  [place] [address]  Phone: [phone]  Fax: [phone]  Follow Up Time:

## 2023-08-29 NOTE — DISCHARGE NOTE NURSING/CASE MANAGEMENT/SOCIAL WORK - NSSCTYPOFSERV_GEN_ALL_CORE
Blood pressure monitoring. Visiting Nurse to call and schedule visit within  24 hours after discharge.

## 2023-08-29 NOTE — DISCHARGE NOTE NURSING/CASE MANAGEMENT/SOCIAL WORK - PATIENT PORTAL LINK FT
You can access the FollowMyHealth Patient Portal offered by [facility] by registering at the following website: [email or web address]. By joining [employer]’s FollowMyHealth portal, you will also be able to view your health information using other applications (apps) compatible with our system.

## 2023-08-29 NOTE — DISCHARGE NOTE NURSING/CASE MANAGEMENT/SOCIAL WORK - NSDCPEFALRISK_GEN_ALL_CORE
For information on Fall & Injury Prevention, visit: [email or web address] OR  [email or web address] OR  https://www.cdc.gov/steadi/patient.html

## 2023-08-29 NOTE — DISCHARGE NOTE NURSING/CASE MANAGEMENT/SOCIAL WORK - NSDCFUADDAPPT_GEN_ALL_CORE_FT
- Continue BP meds as prescribed (hold is BP is under 110/60 or HR is under 60)  -Take blood pressure with at home cuff prior to taking medications; if BP is >150/90 call MD  - Return to hospital with headaches, visual changes, abdominal pain, nausea, vomiting, chest pain or shortness of breathe  - Follow up with OB in 2 days for BP check  - Follow up with [facility] (email sent for follow up; call [phone])

## 2023-08-29 NOTE — DISCHARGE NOTE PROVIDER - HOSPITAL COURSE
44yo P2 s/p  on  admitted on  for postpartum preeclampsia. Patient presented with elevated BPs and a headache. Her labor course on previous admission was c/b sPEC and patient was discharged with Procardia 30XL. On current admission, was started on Magnesium and given Procardia 30XL for blood pressure control. On , Procardia was increased to 60XL. Adequate blood pressure control achieved. Patient discharged on 23 with normal-mild range BPs. Denies headache, blurry vision, scotomas, epigastric pain, RUQ pain, n/v. Patient ot follow up in 2 days for repeat BP check. Cardio-OB to follow up outpatient. 42yo P2 s/p  on  admitted on  for postpartum preeclampsia. Patient presented with elevated BPs and a headache. Her labor course on previous admission was c/b sPEC and patient was discharged with Procardia 30XL. On current admission, was started on Magnesium and given Procardia 30XL for blood pressure control. On , Procardia was increased to 60XL QD. On , labetalol 200mg BID added. Adequate blood pressure control achieved. Patient discharged on 23 with normal-mild range BPs. Denies headache, blurry vision, scotomas, epigastric pain, RUQ pain, n/v. Patient to follow up in 2 days for repeat BP check. Cardio-OB to follow up outpatient. 44yo P2 s/p  on  admitted on  for postpartum preeclampsia. Patient presented with elevated BPs and a headache. Her labor course on previous admission was c/b sPEC and patient was discharged with Procardia 30XL. On current admission, was started on Magnesium and given Procardia 30XL for blood pressure control. On , Procardia was increased to 60XL QD. On , labetalol 200mg BID added. Adequate blood pressure control achieved. Patient discharged on 23 with normal range BPs. Denies headache, blurry vision, scotomas, epigastric pain, RUQ pain, n/v. Patient to follow up in 2 days for repeat BP check. Cardio-OB to follow up outpatient.

## 2023-08-30 VITALS
SYSTOLIC BLOOD PRESSURE: 123 MMHG | OXYGEN SATURATION: 100 % | TEMPERATURE: 99 F | DIASTOLIC BLOOD PRESSURE: 64 MMHG | RESPIRATION RATE: 17 BRPM | HEART RATE: 92 BPM

## 2023-08-30 LAB
ALBUMIN SERPL ELPH-MCNC: 3.2 G/DL — LOW (ref 3.3–5)
ALP SERPL-CCNC: 113 U/L — SIGNIFICANT CHANGE UP (ref 40–120)
ALT FLD-CCNC: 52 U/L — HIGH (ref 4–33)
ANION GAP SERPL CALC-SCNC: 14 MMOL/L — SIGNIFICANT CHANGE UP (ref 7–14)
APTT BLD: 26.4 SEC — SIGNIFICANT CHANGE UP (ref 24.5–35.6)
AST SERPL-CCNC: 55 U/L — HIGH (ref 4–32)
BASOPHILS # BLD AUTO: 0.06 K/UL — SIGNIFICANT CHANGE UP (ref 0–0.2)
BASOPHILS NFR BLD AUTO: 0.5 % — SIGNIFICANT CHANGE UP (ref 0–2)
BILIRUB SERPL-MCNC: 0.2 MG/DL — SIGNIFICANT CHANGE UP (ref 0.2–1.2)
BUN SERPL-MCNC: 10 MG/DL — SIGNIFICANT CHANGE UP (ref 7–23)
CALCIUM SERPL-MCNC: 9.3 MG/DL — SIGNIFICANT CHANGE UP (ref 8.4–10.5)
CHLORIDE SERPL-SCNC: 104 MMOL/L — SIGNIFICANT CHANGE UP (ref 98–107)
CO2 SERPL-SCNC: 20 MMOL/L — LOW (ref 22–31)
CREAT SERPL-MCNC: 0.65 MG/DL — SIGNIFICANT CHANGE UP (ref 0.5–1.3)
EGFR: 111 ML/MIN/1.73M2 — SIGNIFICANT CHANGE UP
EOSINOPHIL # BLD AUTO: 0.13 K/UL — SIGNIFICANT CHANGE UP (ref 0–0.5)
EOSINOPHIL NFR BLD AUTO: 1.1 % — SIGNIFICANT CHANGE UP (ref 0–6)
FIBRINOGEN PPP-MCNC: 502 MG/DL — HIGH (ref 200–465)
GLUCOSE SERPL-MCNC: 107 MG/DL — HIGH (ref 70–99)
HCT VFR BLD CALC: 35.5 % — SIGNIFICANT CHANGE UP (ref 34.5–45)
HGB BLD-MCNC: 11.2 G/DL — LOW (ref 11.5–15.5)
IANC: 7.3 K/UL — SIGNIFICANT CHANGE UP (ref 1.8–7.4)
IMM GRANULOCYTES NFR BLD AUTO: 3.1 % — HIGH (ref 0–0.9)
INR BLD: <0.9 RATIO — SIGNIFICANT CHANGE UP (ref 0.85–1.18)
LDH SERPL L TO P-CCNC: 309 U/L — HIGH (ref 135–225)
LYMPHOCYTES # BLD AUTO: 25.4 % — SIGNIFICANT CHANGE UP (ref 13–44)
LYMPHOCYTES # BLD AUTO: 3 K/UL — SIGNIFICANT CHANGE UP (ref 1–3.3)
MCHC RBC-ENTMCNC: 26.4 PG — LOW (ref 27–34)
MCHC RBC-ENTMCNC: 31.5 GM/DL — LOW (ref 32–36)
MCV RBC AUTO: 83.7 FL — SIGNIFICANT CHANGE UP (ref 80–100)
MONOCYTES # BLD AUTO: 0.95 K/UL — HIGH (ref 0–0.9)
MONOCYTES NFR BLD AUTO: 8.1 % — SIGNIFICANT CHANGE UP (ref 2–14)
NEUTROPHILS # BLD AUTO: 7.3 K/UL — SIGNIFICANT CHANGE UP (ref 1.8–7.4)
NEUTROPHILS NFR BLD AUTO: 61.8 % — SIGNIFICANT CHANGE UP (ref 43–77)
NRBC # BLD: 0 /100 WBCS — SIGNIFICANT CHANGE UP (ref 0–0)
NRBC # FLD: 0 K/UL — SIGNIFICANT CHANGE UP (ref 0–0)
PLATELET # BLD AUTO: 349 K/UL — SIGNIFICANT CHANGE UP (ref 150–400)
POTASSIUM SERPL-MCNC: 4 MMOL/L — SIGNIFICANT CHANGE UP (ref 3.5–5.3)
POTASSIUM SERPL-SCNC: 4 MMOL/L — SIGNIFICANT CHANGE UP (ref 3.5–5.3)
PROT SERPL-MCNC: 7.4 G/DL — SIGNIFICANT CHANGE UP (ref 6–8.3)
PROTHROM AB SERPL-ACNC: 10.1 SEC — SIGNIFICANT CHANGE UP (ref 9.5–13)
RBC # BLD: 4.24 M/UL — SIGNIFICANT CHANGE UP (ref 3.8–5.2)
RBC # FLD: 16.9 % — HIGH (ref 10.3–14.5)
SODIUM SERPL-SCNC: 138 MMOL/L — SIGNIFICANT CHANGE UP (ref 135–145)
URATE SERPL-MCNC: 6.4 MG/DL — SIGNIFICANT CHANGE UP (ref 2.5–7)
WBC # BLD: 11.8 K/UL — HIGH (ref 3.8–10.5)
WBC # FLD AUTO: 11.8 K/UL — HIGH (ref 3.8–10.5)

## 2023-08-30 PROCEDURE — 99238 HOSP IP/OBS DSCHRG MGMT 30/<: CPT | Mod: GC

## 2023-08-30 RX ORDER — LABETALOL HCL 100 MG
1 TABLET ORAL
Qty: 0 | Refills: 0 | DISCHARGE
Start: 2023-08-30

## 2023-08-30 RX ORDER — LABETALOL HCL 100 MG
1 TABLET ORAL
Qty: 90 | Refills: 0
Start: 2023-08-30 | End: 2023-09-28

## 2023-08-30 RX ORDER — LABETALOL HCL 100 MG
1 TABLET ORAL
Qty: 60 | Refills: 0
Start: 2023-08-30 | End: 2023-09-28

## 2023-08-30 RX ORDER — NIFEDIPINE 30 MG
1 TABLET, EXTENDED RELEASE 24 HR ORAL
Qty: 30 | Refills: 0
Start: 2023-08-30 | End: 2023-09-28

## 2023-08-30 RX ADMIN — Medication 60 MILLIGRAM(S): at 06:18

## 2023-08-30 RX ADMIN — Medication 200 MILLIGRAM(S): at 06:18

## 2023-08-30 NOTE — PROGRESS NOTE ADULT - ATTENDING SUPERVISION STATEMENT
Obstetric Discharge Summary    Reasons for Admission on 3/31/2022  8:59 AM  Onset of Labor   Section (Repeat)    Prenatal Procedures  None    Intrapartum Procedures  [place] Delivery    Postpartum Procedures  None     Data  Information for the patient's :  Lakisha Roman [[id number]]   female   Birth Weight: 5 lb 5.9 oz (2.434 kg)     Discharge With Mother  Complications: No    Discharge Diagnosis  Patient Active Problem List    Diagnosis Date Noted    S/P      Term pregnancy 2022    Admitted to labor and delivery 2022    Oligohydramnios antepartum, unspecified trimester, fetus 1     Oligohydramnios antepartum, third trimester, fetus 1     SGA (small for gestational age), fetal, affecting care of mother, antepartum, third trimester, fetus 3     Type A blood, Rh negative 09/15/2021    History of  delivery, currently pregnant        Discharge Information  Current Discharge Medication List        START taking these medications    Details   oxyCODONE-acetaminophen (PERCOCET) 5-325 MG per tablet Take 1 tablet by mouth every 6 hours as needed for Pain for up to 3 days.   Qty: 10 tablet, Refills: 0    Comments: Reduce doses taken as pain becomes manageable  Associated Diagnoses: S/P       ibuprofen (ADVIL;MOTRIN) 800 MG tablet Take 1 tablet by mouth every 8 hours  Qty: 30 tablet, Refills: 3           CONTINUE these medications which have NOT CHANGED    Details   ondansetron (ZOFRAN-ODT) 4 MG disintegrating tablet Take 1 tablet by mouth 3 times daily as needed for Nausea or Vomiting  Qty: 30 tablet, Refills: 2    Associated Diagnoses: Nausea and vomiting in pregnancy      docusate sodium (COLACE) 100 MG capsule Take 1 capsule by mouth 2 times daily as needed for Constipation  Qty: 60 capsule, Refills: 0      famotidine (PEPCID) 20 MG tablet Take 1 tablet by mouth 2 times daily  Qty: 60 tablet, Refills: 3      Prenatal Vit-Fe Fumarate-FA (PRENATAL
VITAMINS) 28-0.8 MG TABS Take 1 tablet by mouth daily  Qty: 30 tablet, Refills: 0             No discharge procedures on file. Discharge to: Home    Condition on discharge: Stable      Discharge Date: April 2, 2022      Ivan Treviño.  Jelena Bentley MBA[place] [facility] [place] 2, [place]22
Resident

## 2023-08-30 NOTE — PROGRESS NOTE ADULT - ATTENDING COMMENTS
Rosa feeling much better today  BPs improved this afternoon after increasing procardia to 60  for discharge and f/u nursing visit in a few days for BP check
Pt seen and evaluated at bedside  Agree with above  BPs well controlled on current regimen   D/c home  BP check in 2 days    letitia SHEETS
patient seen and examined at bedside. agree with above assessment and plan

## 2023-08-30 NOTE — PROGRESS NOTE ADULT - ASSESSMENT
A/P: 44y/o  s/p  c/b sPEC/Mg on , admitted for postpartum preeclampsia with severe features with elevated LFTs. Patient currently in stable condition. Patient's discharge was cancelled yesterday 2/2 elevated BPs. Labetalol 200 BID added to regimen and patient observed overnight. No severe symptoms at this time.     #Preeclampsia w/ severe features  - BP's overnight 122/56 - 159/68  - f/u AM HELLP labs w/ AST//56->112/66->98/66->77/63 (improving transaminitis)  - s/p Magnesium Prophylaxis  - Continue w/ Procardia 60 XL  - Continue to monitor BP's    #Routine Postpartum Care  - Continue with PO analgesia  - Increase ambulation  - Continue regular diet      Audra Forde MD, PGY-3 
42y/o  s/p  c/b sPEC/Mg on , admitted for postpartum preeclampsia with severe features with elevated LFTs. Patient currently in stable condition.    #Preeclampsia w/ severe features  - BP's overnight 130s-140s/50s-60s  - HELLP labs w/ AST//56; f/u AM HELLP  - c/w Mg for seizure prophylaxis, 12 vs 24h dependent on clinical situation  - Continue w/ Procardia 30  - Continue to monitor BP's    #Routine Postpartum Care  - Continue with PO analgesia  - Increase ambulation  - Continue regular diet    Nayely Sheu  PGY-3
A/P: 42y/o  s/p  c/b sPEC/Mg on , admitted for postpartum preeclampsia with severe features with elevated LFTs. Patient currently in stable condition.    #Preeclampsia w/ severe features  - BP's overnight 130s-140s/50s-60s  - HELLP labs w/ AST//56->112/66->98/66->77/63 (improving transaminitis)  - s/p Magnesium Prophylaxis  - Continue w/ Procardia 60 XL  - Continue to monitor BP's    #Routine Postpartum Care  - Continue with PO analgesia  - Increase ambulation  - Continue regular diet      Audra Forde MD, PGY-3

## 2023-08-30 NOTE — PROGRESS NOTE ADULT - SUBJECTIVE AND OBJECTIVE BOX
Patient seen and examined at bedside, no acute overnight events. No acute complaints, pain well controlled. Patient is ambulating, voiding spontaneously, passing gas, and tolerating regular diet. Denies CP, SOB, N/V, HA, blurred vision, epigastric pain.    Vital Signs Last 24 Hours  T(C): 36.8 (08-28-23 @ 03:00), Max: 36.8 (08-28-23 @ 03:00)  HR: 105 (08-28-23 @ 03:00) (86 - 105)  BP: 134/57 (08-28-23 @ 03:00) (134/57 - 154/55)  RR: 16 (08-28-23 @ 03:00) (15 - 16)  SpO2: 98% (08-28-23 @ 03:00) (96% - 99%)    Physical Exam:  General: NAD  Abdomen: Soft, non-tender, non-distended, fundus firm  Pelvic: minimal lochia    Labs:  Blood Type: A Positive  Antibody Screen: Negative  RPR: Negative               10.2   12.41 )-----------( 296      ( 08-27 @ 20:45 )             32.1                9.9    19.74 )-----------( 248      ( 08-25 @ 06:44 )             30.4                11.7   20.71 )-----------( 268      ( 08-24 @ 11:00 )             36.5     MEDICATIONS  (STANDING):  lactated ringers. 1000 milliLiter(s) (50 mL/Hr) IV Continuous <Continuous>  magnesium sulfate Infusion 2 Gm/Hr (50 mL/Hr) IV Continuous <Continuous>  NIFEdipine XL 30 milliGRAM(s) Oral daily    MEDICATIONS  (PRN):  ibuprofen  Tablet. 600 milliGRAM(s) Oral every 6 hours PRN Moderate Pain (4 - 6)
Patient seen and examined at bedside, no acute overnight events. No acute complaints, pain well controlled. Patient is ambulating, voiding spontaneously, passing gas, and tolerating regular diet. Denies CP, SOB, N/V, HA, blurred vision, epigastric pain.    Vital Signs Last 24 Hrs  T(C): 36.7 (29 Aug 2023 06:24), Max: 37.1 (28 Aug 2023 22:20)  T(F): 98.1 (29 Aug 2023 06:24), Max: 98.7 (28 Aug 2023 22:20)  HR: 81 (29 Aug 2023 06:24) (81 - 98)  BP: 139/60 (29 Aug 2023 06:24) (136/57 - 159/61)  BP(mean): 81 (28 Aug 2023 11:30) (81 - 93)  RR: 18 (29 Aug 2023 06:24) (16 - 18)  SpO2: 98% (29 Aug 2023 06:24) (98% - 100%)    Parameters below as of 29 Aug 2023 06:24  Patient On (Oxygen Delivery Method): room air      Physical Exam:  General: NAD  Abdomen: Soft, non-tender, non-distended, fundus firm  Pelvic: minimal lochia    Labs:  Blood type: A Positive  Rubella IgG: RPR: Negative                          10.8<L>   10.95<H> >-----------< 283    ( 08-29 @ 06:20 )             34.5                        10.7<L>   12.51<H> >-----------< 323    ( 08-28 @ 19:00 )             33.1<L>                        10.7<L>   10.61<H> >-----------< 308    ( 08-28 @ 11:50 )             33.8<L>                        10.5<L>   11.25<H> >-----------< 308    ( 08-28 @ 05:15 )             32.5<L>                        10.2<L>   12.41<H> >-----------< 296    ( 08-27 @ 20:45 )             32.1<L>    08-29-23 @ 06:20      135  |  100  |  9   ----------------------------<  113<H>  4.0   |  23  |  0.63    08-28-23 @ 19:00      135  |  98  |  8   ----------------------------<  142<H>  3.9   |  25  |  0.63    08-28-23 @ 05:15      136  |  100  |  9   ----------------------------<  117<H>  3.6   |  22  |  0.66    08-27-23 @ 20:45      139  |  101  |  10  ----------------------------<  100<H>  3.8   |  24  |  0.85        Ca    8.4      29 Aug 2023 06:20  Ca    8.3<L>      28 Aug 2023 19:00  Ca    8.5      28 Aug 2023 05:15  Ca    9.4      27 Aug 2023 20:45  Mg     6.10<H>     08-28  Mg     5.90<H>     08-28  Mg     5.60<H>     08-28    TPro  7.1  /  Alb  3.1<L>  /  TBili  <0.2  /  DBili  x   /  AST  77<H>  /  ALT  63<H>  /  AlkPhos  113  08-29-23 @ 06:20  TPro  7.3  /  Alb  3.4  /  TBili  <0.2  /  DBili  x   /  AST  98<H>  /  ALT  66<H>  /  AlkPhos  124<H>  08-28-23 @ 19:00  TPro  7.1  /  Alb  3.3  /  TBili  <0.2  /  DBili  x   /  AST  112<H>  /  ALT  66<H>  /  AlkPhos  124<H>  08-28-23 @ 05:15  TPro  7.1  /  Alb  3.2<L>  /  TBili  0.2  /  DBili  x   /  AST  101<H>  /  ALT  56<H>  /  AlkPhos  123<H>  08-27-23 @ 20:45          MEDICATIONS  (STANDING):  heparin   Injectable 5000 Unit(s) SubCutaneous every 12 hours  lactated ringers. 1000 milliLiter(s) (50 mL/Hr) IV Continuous <Continuous>  magnesium sulfate Infusion 2 Gm/Hr (50 mL/Hr) IV Continuous <Continuous>  NIFEdipine XL 60 milliGRAM(s) Oral daily    MEDICATIONS  (PRN):  ibuprofen  Tablet. 600 milliGRAM(s) Oral every 6 hours PRN Moderate Pain (4 - 6)      
R3 Antepartum Note: PPD #6 HD#4    Patient seen and examined at bedside, no acute overnight events. No acute complaints, pain well controlled. Patient is ambulating, voiding spontaneously, passing gas, and tolerating regular diet. Denies CP, SOB, N/V, HA, blurred vision, epigastric pain.    Vital Signs Last 24 Hrs  T(C): 37.1 (30 Aug 2023 06:17), Max: 37.3 (29 Aug 2023 18:05)  T(F): 98.8 (30 Aug 2023 06:17), Max: 99.1 (29 Aug 2023 18:05)  HR: 84 (30 Aug 2023 06:17) (84 - 102)  BP: 134/68 (30 Aug 2023 06:17) (122/56 - 159/68)  RR: 17 (30 Aug 2023 06:17) (16 - 17)  SpO2: 99% (30 Aug 2023 06:17) (99% - 100%)    Parameters below as of 30 Aug 2023 01:30  Patient On (Oxygen Delivery Method): room air      Physical Exam:  General: NAD  Abdomen: Soft, non-tender, non-distended, fundus firm  Pelvic: minimal lochia    Labs:  Blood type: A Positive  Rubella IgG: RPR: Negative                          10.8<L>   10.95<H> >-----------< 283    ( 08-29 @ 06:20 )             34.5                        10.7<L>   12.51<H> >-----------< 323    ( 08-28 @ 19:00 )             33.1<L>                        10.7<L>   10.61<H> >-----------< 308    ( 08-28 @ 11:50 )             33.8<L>                        10.5<L>   11.25<H> >-----------< 308    ( 08-28 @ 05:15 )             32.5<L>                        10.2<L>   12.41<H> >-----------< 296    ( 08-27 @ 20:45 )             32.1<L>    08-29-23 @ 06:20      135  |  100  |  9   ----------------------------<  113<H>  4.0   |  23  |  0.63    08-28-23 @ 19:00      135  |  98  |  8   ----------------------------<  142<H>  3.9   |  25  |  0.63    08-28-23 @ 05:15      136  |  100  |  9   ----------------------------<  117<H>  3.6   |  22  |  0.66    08-27-23 @ 20:45      139  |  101  |  10  ----------------------------<  100<H>  3.8   |  24  |  0.85        Ca    8.4      29 Aug 2023 06:20  Ca    8.3<L>      28 Aug 2023 19:00  Ca    8.5      28 Aug 2023 05:15  Ca    9.4      27 Aug 2023 20:45  Mg     6.10<H>     08-28  Mg     5.90<H>     08-28  Mg     5.60<H>     08-28    TPro  7.1  /  Alb  3.1<L>  /  TBili  <0.2  /  DBili  x   /  AST  77<H>  /  ALT  63<H>  /  AlkPhos  113  08-29-23 @ 06:20  TPro  7.3  /  Alb  3.4  /  TBili  <0.2  /  DBili  x   /  AST  98<H>  /  ALT  66<H>  /  AlkPhos  124<H>  08-28-23 @ 19:00  TPro  7.1  /  Alb  3.3  /  TBili  <0.2  /  DBili  x   /  AST  112<H>  /  ALT  66<H>  /  AlkPhos  124<H>  08-28-23 @ 05:15  TPro  7.1  /  Alb  3.2<L>  /  TBili  0.2  /  DBili  x   /  AST  101<H>  /  ALT  56<H>  /  AlkPhos  123<H>  08-27-23 @ 20:45            MEDICATIONS  (STANDING):  heparin   Injectable 5000 Unit(s) SubCutaneous every 12 hours  labetalol 200 milliGRAM(s) Oral two times a day  NIFEdipine XL 60 milliGRAM(s) Oral daily    MEDICATIONS  (PRN):  ibuprofen  Tablet. 600 milliGRAM(s) Oral every 6 hours PRN Moderate Pain (4 - 6)

## 2023-08-31 ENCOUNTER — NON-APPOINTMENT (OUTPATIENT)
Age: 43
End: 2023-08-31

## 2023-09-01 ENCOUNTER — APPOINTMENT (OUTPATIENT)
Dept: OBGYN | Facility: CLINIC | Age: 43
End: 2023-09-01
Payer: MEDICAID

## 2023-09-01 PROCEDURE — 99211 OFF/OP EST MAY X REQ PHY/QHP: CPT

## 2023-09-04 ENCOUNTER — NON-APPOINTMENT (OUTPATIENT)
Age: 43
End: 2023-09-04

## 2023-09-11 ENCOUNTER — APPOINTMENT (OUTPATIENT)
Dept: CARDIOLOGY | Facility: CLINIC | Age: 43
End: 2023-09-11
Payer: MEDICAID

## 2023-09-11 DIAGNOSIS — Z83.438 FAMILY HISTORY OF OTHER DISORDER OF LIPOPROTEIN METABOLISM AND OTHER LIPIDEMIA: ICD-10-CM

## 2023-09-11 DIAGNOSIS — Z87.59 PERSONAL HISTORY OF OTHER COMPLICATIONS OF PREGNANCY, CHILDBIRTH AND THE PUERPERIUM: ICD-10-CM

## 2023-09-11 DIAGNOSIS — Z82.49 FAMILY HISTORY OF ISCHEMIC HEART DISEASE AND OTHER DISEASES OF THE CIRCULATORY SYSTEM: ICD-10-CM

## 2023-09-11 DIAGNOSIS — O13.3 GESTATIONAL [PREGNANCY-INDUCED] HYPERTENSION W/OUT SIGNIFICANT PROTEINURIA, THIRD TRIMESTER: ICD-10-CM

## 2023-09-11 DIAGNOSIS — Z83.3 FAMILY HISTORY OF DIABETES MELLITUS: ICD-10-CM

## 2023-09-11 PROCEDURE — 99203 OFFICE O/P NEW LOW 30 MIN: CPT | Mod: 95

## 2023-09-12 PROBLEM — Z83.438 FAMILY HISTORY OF HYPERLIPIDEMIA: Status: ACTIVE | Noted: 2023-09-12

## 2023-09-12 PROBLEM — O13.3 GESTATIONAL HYPERTENSION, THIRD TRIMESTER: Status: ACTIVE | Noted: 2023-08-22 | Resolved: 2024-01-19

## 2023-09-12 PROBLEM — Z87.59 HISTORY OF SEVERE PRE-ECLAMPSIA: Status: RESOLVED | Noted: 2023-09-12 | Resolved: 2023-09-12

## 2023-09-12 PROBLEM — Z82.49 FAMILY HISTORY OF ESSENTIAL HYPERTENSION: Status: ACTIVE | Noted: 2023-09-12

## 2023-09-12 PROBLEM — Z83.3 FAMILY HISTORY OF DIABETES MELLITUS: Status: ACTIVE | Noted: 2023-09-12

## 2023-09-14 ENCOUNTER — NON-APPOINTMENT (OUTPATIENT)
Age: 43
End: 2023-09-14

## 2023-09-19 NOTE — DISCHARGE NOTE OB - MEDICATION SUMMARY - MEDICATIONS TO STOP TAKING
[facility]  Hospitalist Discharge Summary      Date of Admission:  9/15/2023  Date of Discharge:  9/19/2023  4:07 PM  Discharging Provider: Dereje Isaac MD  Discharge Service: Hospitalist Service    Discharge Diagnoses   Decompensated HFrEF  Bilateral lower extremity wounds    Clinically Significant Risk Factors          Follow-ups Needed After Discharge   Follow-up Appointments     Follow-up and recommended labs and tests       Follow up with primary care provider, Pankaj Montanez, within 7 days for   hospital follow- up.  The following labs/tests are recommended:  - follow up elevated blood glucose  - follow up suspected lipomas on back, monitor for irritation/developing   infection associated with lesions; consider further evaluation with   dermatology  - wound care referral placed for lower extremity wounds    Follow up with cardiology within 2 weeks for heart failure followup:  - repeat BMP to assess kidney function and electrolytes with next followup  - titrate diuretics as needed            Unresulted Labs Ordered in the Past 30 Days of this Admission       No orders found from 8/16/2023 to 9/16/2023.            Discharge Disposition   Discharged to home  Condition at discharge: Stable    Hospital Course   The patient was admitted with decompensated HFrEF.  Cardiology was consulted.  The patient was treated with IV diuresis with improvement in volume status.  She also had improvement in her oxygen requirement, approximating her baseline oxygen requirement by discharge.  PT recommended TCU at discharge but patient declined and was discharged home.    Consultations This Hospital Stay   WOUND OSTOMY CONTINENCE NURSE  IP CONSULT  CORE CLINIC EVALUATION IP CONSULT  OCCUPATIONAL THERAPY ADULT IP CONSULT  NUTRITION SERVICES ADULT IP CONSULT  CARE MANAGEMENT / SOCIAL WORK IP CONSULT  CARDIOLOGY IP CONSULT  PHYSICAL THERAPY ADULT IP CONSULT  INTEGRATIVE THERAPIES CONSULT  ACUPUNCTURE IP  CONSULT    Code Status   Prior    Time Spent on this Encounter   I, Dereje Isaac MD, personally saw the patient today and spent less than or equal to 30 minutes discharging this patient.       Dereje Isaac MD  [facility]  [address] [address]  Phone: [phone]  Fax: [phone]  ______________________________________________________________________    Physical Exam   Vital Signs: Temp: 98.1  F (36.7  C) Temp src: Oral BP: 110/51 Pulse: 73   Resp: 16 SpO2: 90 % O2 Device: Nasal cannula Oxygen Delivery: 3 LPM  Weight: 113 lbs 1.54 oz    See progress note       Primary Care Physician   Pankaj Montanez    Discharge Orders      Medication Therapy Management Referral      Wound Care Referral      Follow-Up with Cardiology [facility] Heart Failure Discharge      Follow-up and recommended labs and tests     Follow up with primary care provider, Pankaj Montanez, within 7 days for hospital follow- up.  The following labs/tests are recommended:  - follow up elevated blood glucose  - follow up suspected lipomas on back, monitor for irritation/developing infection associated with lesions; consider further evaluation with dermatology  - wound care referral placed for lower extremity wounds    Follow up with cardiology within 2 weeks for heart failure followup:  - repeat BMP to assess kidney function and electrolytes with next followup  - titrate diuretics as needed     Activity    Your activity upon discharge: activity as tolerated     Reason for your hospital stay    You were admitted to the hospital with a heart failure exacerbation.  You were seen by cardiology and treated with IV medication to remove fluid.  You had improvement by discharge.  Your home furosemide dose was increased on discharge.     Diet    Follow this diet upon discharge: Orders Placed This Encounter      2 Gram Sodium Diet       Significant Results and Procedures   Most Recent 3 CBC's:  Recent Labs    Lab Test 09/18/23  0430 09/16/23  1353 09/15/23  2118   WBC 9.6 9.3 8.4   HGB 10.6* 12.3 11.7   MCV 93 93 92    258 233     Most Recent 3 BMP's:  Recent Labs   Lab Test 09/19/23  0507 09/18/23  0430 09/17/23  0652 09/15/23  1953   NA  --  137 140 141   POTASSIUM 3.5 3.5 3.5  3.5 3.9   CHLORIDE  --  99 99 105   CO2  --  31* 30* 23   BUN  --  29.8* 18.5 13.8   CR  --  2.03* 1.63* 1.45*   ANIONGAP  --  7 11 13   MESSI  --  8.0* 8.9 8.9   GLC  --  125* 98 101*   ,   Results for orders placed or performed during the hospital encounter of 09/15/23   XR Chest Port 1 View    Narrative    EXAM: XR CHEST PORT 1 VIEW  LOCATION: [facility]  DATE: 9/15/2023    INDICATION: dyspnea  COMPARISON: 08/28/2023      Impression    IMPRESSION: Since 08/28/2023 there has been increase in the patchy infiltrate in the left midlung worrisome for pneumonitis. The chest is otherwise stable to include findings of bibasilar atelectasis and area of nodularity projected over the right   midlung laterally.     *Note: Due to a large number of results and/or encounters for the requested time period, some results have not been displayed. A complete set of results can be found in Results Review.       Discharge Medications   Discharge Medication List as of 9/19/2023  2:08 PM        CONTINUE these medications which have CHANGED    Details   cetirizine (ZYRTEC) 5 MG tablet Take 1 tablet (5 mg) by mouth daily as needed for allergies, No Print Out      famotidine (PEPCID) 20 MG tablet Take 1 tablet (20 mg) by mouth every other day, Disp-90 tablet, R-0, No Print Out      furosemide (LASIX) 80 MG tablet Take 1 tablet (80 mg) by mouth 2 times daily, Disp-28 tablet, R-0, E-Prescribe      mirabegron (MYRBETRIQ) 25 MG 24 hr tablet Take 1 tablet (25 mg) by mouth daily, Disp-30 tablet, R-0, E-Prescribe      spironolactone (ALDACTONE) 25 MG tablet Take 1 tablet (25 mg) by mouth daily, Disp-30 tablet, R-0, E-Prescribe            CONTINUE these medications which have NOT CHANGED    Details   acetaminophen (TYLENOL) 500 MG tablet Take 1,000 mg by mouth every 8 hours as needed for mild pain or fever , Historical      acetylcysteine (MUCOMYST) 10 % nebulizer solution Inhale 4 mLs into the lungs every 4 hours as needed for mucolysis/respiratory distress, Historical      albuterol (PROAIR HFA/PROVENTIL HFA/VENTOLIN HFA) 108 (90 Base) MCG/ACT inhaler Inhale 2 puffs into the lungs every 6 hours as needed for shortness of breath, wheezing or cough, Historical      Azelastine HCl 137 MCG/SPRAY SOLN Spray 1 spray into both nostrils 2 times daily as needed for rhinitis, Historical      benzonatate (TESSALON PERLES) 100 MG capsule Take 1 capsule (100 mg) by mouth 3 times daily as needed for cough, Disp-90 capsule, R-3, E-Prescribe      CALCIUM-MAGNESIUM-ZINC PO Take 1 tablet by mouth daily, Historical      chlorhexidine (PERIDEX) 0.12 % solution [CHLORHEXIDINE (PERIDEX) 0.12 % SOLUTION] Apply 15 mL to the mouth or throat at bedtime as needed. , Historical      escitalopram (LEXAPRO) 10 MG tablet Take 1 tablet (10 mg) by mouth daily, Disp-90 tablet, R-3, E-PrescribeDose increase      fluticasone (FLONASE) 50 MCG/ACT nasal spray Spray 1 spray into both nostrils daily as needed for rhinitis or allergies, Historical      Fluticasone-Umeclidin-Vilanterol (TRELEGY ELLIPTA) 200-62.5-25 MCG/INH oral inhaler Inhale 1 puff into the lungs At Bedtime, Historical      gabapentin (NEURONTIN) 600 MG tablet Take 600 mg by mouth At Bedtime, Historical      HYDROmorphone, STANDARD CONC, (DILAUDID) 1 MG/ML oral solution Take 1 mg by mouth 3 times daily as needed for pain, Historical      ipratropium (ATROVENT HFA) 17 MCG/ACT inhaler Inhale 2 puffs into the lungs every 6 hours as needed for wheezing, Historical      ipratropium - albuterol 0.5 mg/2.5 mg/3 mL (DUONEB) 0.5-2.5 (3) MG/3ML neb solution Take 1 vial by nebulization every 6 hours as needed for shortness of  breath, wheezing or cough, Historical      Melatonin 5 MG TBDP Take 15 mg by mouth At Bedtime, Historical      metoprolol succinate ER (TOPROL XL) 50 MG 24 hr tablet Take 1.5 tablets (75 mg) by mouth At Bedtime, Disp-135 tablet, R-1, E-Prescribe      multivitamin w/minerals (THERA-VIT-M) tablet Take 1 tablet by mouth daily, Historical      nystatin (MYCOSTATIN) 756125 UNIT/GM external cream Apply topically 3 times daily as needed for dry skinHistorical      OLANZapine (ZYPREXA) 5 MG tablet Take 1 tablet (5 mg) by mouth At Bedtime, Disp-30 tablet, R-3, E-Prescribe      prochlorperazine (COMPAZINE) 10 MG tablet Take 1 tablet (10 mg) by mouth every 6 hours as needed (Nausea/Vomiting), Disp-30 tablet, R-5, E-Prescribe      rivaroxaban ANTICOAGULANT (XARELTO) 15 MG TABS tablet Take 1 tablet (15 mg) by mouth At Bedtime, Historical      simvastatin (ZOCOR) 40 MG tablet Take 1 tablet (40 mg) by mouth At Bedtime, Disp-90 tablet, R-3, E-Prescribe      sodium chloride 0.9 % neb solution Take 3 mLs by nebulization every 3 hours as needed for wheezing, Historical      solifenacin (VESICARE) 5 MG tablet Take 1 tablet (5 mg) by mouth daily, Disp-90 tablet, R-3, E-Prescribe      sotorasib (LUMAKRAS) 120 MG tablet Take 8 tablets (960 mg) by mouth daily for 30 days Do not chew, crush or split tablets., Disp-240 tablet, R-0, E-Prescribe      tamoxifen (NOLVADEX) 20 MG tablet Take 1 tablet (20 mg) by mouth daily, Disp-90 tablet, R-3, E-Prescribe      traMADol (ULTRAM) 50 MG tablet Take 50 mg by mouth 2 times daily as needed for severe pain, Historical      vitamin D3 (CHOLECALCIFEROL) 50 mcg (2000 units) tablet Take 50 mcg by mouth daily, Historical      zinc oxide (DESITIN) 40 % external ointment Apply topically as needed for dry skin or irritation (Twice a day to gluteal area skin irritation)Disp-113 g, [id number]           Allergies   Allergies   Allergen Reactions    Sulfa (Sulfonamide Antibiotics) [Sulfa Antibiotics] Rash      Headaches and dizziness.    Homeopathic Drugs [External Allergen Needs Reconciliation - See Comment] Unknown     runny nose    Mold Extracts [Molds & Smuts] Unknown    Morphine (Pf) [Morphine] Unknown     hallucinate    Lisinopril Itching, Cough and Unknown     cough    Sulfacetamide Sodium [Sulfacetamide] Rash      I will STOP taking the medications listed below when I get home from the hospital:  None

## 2023-09-21 ENCOUNTER — NON-APPOINTMENT (OUTPATIENT)
Age: 43
End: 2023-09-21

## 2023-10-20 ENCOUNTER — APPOINTMENT (OUTPATIENT)
Dept: OBGYN | Facility: CLINIC | Age: 43
End: 2023-10-20
Payer: MEDICAID

## 2023-10-20 VITALS
SYSTOLIC BLOOD PRESSURE: 141 MMHG | WEIGHT: 161 LBS | HEART RATE: 80 BPM | BODY MASS INDEX: 28.53 KG/M2 | DIASTOLIC BLOOD PRESSURE: 75 MMHG | HEIGHT: 63 IN

## 2023-10-20 DIAGNOSIS — Z30.2 ENCOUNTER FOR STERILIZATION: ICD-10-CM

## 2023-10-20 PROCEDURE — 0503F POSTPARTUM CARE VISIT: CPT

## 2023-10-20 RX ORDER — NIFEDIPINE 30 MG/1
30 TABLET, FILM COATED, EXTENDED RELEASE ORAL DAILY
Qty: 30 | Refills: 2 | Status: COMPLETED | COMMUNITY
Start: 2023-08-26 | End: 2023-10-20

## 2023-10-20 RX ORDER — LABETALOL HYDROCHLORIDE 200 MG/1
200 TABLET, FILM COATED ORAL
Qty: 60 | Refills: 0 | Status: COMPLETED | COMMUNITY
Start: 2023-08-31 | End: 2023-10-20

## 2023-10-27 ENCOUNTER — RX RENEWAL (OUTPATIENT)
Age: 43
End: 2023-10-27

## 2023-10-27 RX ORDER — PANTOPRAZOLE 20 MG/1
20 TABLET, DELAYED RELEASE ORAL
Qty: 90 | Refills: 0 | Status: ACTIVE | COMMUNITY
Start: 2023-07-25 | End: 1900-01-01

## 2023-12-04 ENCOUNTER — APPOINTMENT (OUTPATIENT)
Dept: CARDIOLOGY | Facility: CLINIC | Age: 43
End: 2023-12-04

## 2023-12-16 NOTE — OB RN PATIENT PROFILE - PRO PRENATAL LABS ORI SOURCE HIV
Vital Signs Last 24 Hrs  T(C): 36.5 (12-16-23 @ 09:25), Max: 36.5 (12-16-23 @ 09:25)  T(F): 97.7 (12-16-23 @ 09:25), Max: 97.7 (12-16-23 @ 09:25)  HR: 62 (12-16-23 @ 09:25) (62 - 71)  BP: 100/64 (12-16-23 @ 09:25) (100/64 - 139/85)  BP(mean): --  RR: 18 (12-16-23 @ 09:25) (18 - 18)  SpO2: 95% (12-16-23 @ 09:25) (95% - 100%)     hard copy, drawn during this pregnancy Vital Signs Last 24 Hrs  T(C): 36.5 (12-16-23 @ 09:25), Max: 36.5 (12-16-23 @ 09:25)  T(F): 97.7 (12-16-23 @ 09:25), Max: 97.7 (12-16-23 @ 09:25)  HR: 62 (12-16-23 @ 09:25) (62 - 62)  BP: 100/64 (12-16-23 @ 09:25) (100/64 - 100/64)  BP(mean): --  RR: 18 (12-16-23 @ 09:25) (18 - 18)  SpO2: 95% (12-16-23 @ 09:25) (95% - 95%)

## 2023-12-29 NOTE — OB RN PATIENT PROFILE - STEPS TO INITIATE SKIN TO SKIN CONTACT DISCUSSED, INCLUDING INITIATING FATHER SKIN TO SKIN IF POSSIBLE.
Detail Level: Detailed Depth Of Biopsy: dermis Was A Bandage Applied: Yes Size Of Lesion In Cm: 0 Biopsy Type: H and E Biopsy Method: Dermablade Anesthesia Type: 1% lidocaine with epinephrine Anesthesia Volume In Cc: 0.5 Hemostasis: Drysol Wound Care: Petrolatum Dressing: bandage Destruction After The Procedure: No Type Of Destruction Used: Curettage Curettage Text: The wound bed was treated with curettage after the biopsy was performed. Cryotherapy Text: The wound bed was treated with cryotherapy after the biopsy was performed. Electrodesiccation Text: The wound bed was treated with electrodesiccation after the biopsy was performed. Electrodesiccation And Curettage Text: The wound bed was treated with electrodesiccation and curettage after the biopsy was performed. Silver Nitrate Text: The wound bed was treated with silver nitrate after the biopsy was performed. Lab: -8 Lab Facility: 3 Consent: Written consent was obtained and risks were reviewed including but not limited to scarring, infection, bleeding, scabbing, incomplete removal, nerve damage and allergy to anesthesia. Post-Care Instructions: I reviewed with the patient in detail post-care instructions. Patient is to keep the biopsy site dry overnight, and then apply bacitracin twice daily until healed. Patient may apply hydrogen peroxide soaks to remove any crusting. Notification Instructions: Patient will be notified of biopsy results. However, patient instructed to call the office if not contacted within 2 weeks. Billing Type: Third-Party Bill Information: Selecting Yes will display possible errors in your note based on the variables you have selected. This validation is only offered as a suggestion for you. PLEASE NOTE THAT THE VALIDATION TEXT WILL BE REMOVED WHEN YOU FINALIZE YOUR NOTE. IF YOU WANT TO FAX A PRELIMINARY NOTE YOU WILL NEED TO TOGGLE THIS TO 'NO' IF YOU DO NOT WANT IT IN YOUR FAXED NOTE. Statement Selected

## 2024-01-18 ENCOUNTER — APPOINTMENT (OUTPATIENT)
Dept: OBGYN | Facility: HOSPITAL | Age: 44
End: 2024-01-18

## 2024-02-25 NOTE — DISCHARGE NOTE OB - AVOID PROLONGED STANDING
"Pain control w prns,RLE elevated ,states numbness/tingling "comes and goes", dressing intact to RLE w external fixator device,poc reviewed stated understanding,guard prersent,bed alarm set.  " Statement Selected

## 2024-08-01 NOTE — H&P ADULT - HIV OFFER
Per verbal order from Dr. Herrera,  patient received a bilateral  ear lavage.  Patient gave verbal permission for the procedure.    One mL of liquid 1% Docusate Sodium was inserted into both ear canals. The patient then sat for 5-10 minutes to allow the solution to soften the debris.     By gently pulling up and back on the outer edge of the external ear, the ear canal and tympanic membrane were visualized by inserting a standard otoscope into the ear canal.  A clean Elephant Ear Washer bottle with warm (body temperature) tap water was used to flush out the debris.  The patient was in a sitting position so that the patient's ear was at eye level.  Patient tolerated the procedure well.  Cerumen was expelled from both ear canals.    The procedure took approximately 20   minutes, with minimal / moderate / significant: significant difficulty.  An additional tool of a curette was used to remove debris.    Physician was informed of results of the ear lavage.   Previously Negative (within the last year)

## 2024-08-27 ENCOUNTER — APPOINTMENT (OUTPATIENT)
Dept: OBGYN | Facility: CLINIC | Age: 44
End: 2024-08-27
Payer: COMMERCIAL

## 2024-08-27 VITALS
WEIGHT: 159 LBS | DIASTOLIC BLOOD PRESSURE: 80 MMHG | HEART RATE: 76 BPM | SYSTOLIC BLOOD PRESSURE: 127 MMHG | HEIGHT: 63 IN | BODY MASS INDEX: 28.17 KG/M2

## 2024-08-27 DIAGNOSIS — Z01.419 ENCOUNTER FOR GYNECOLOGICAL EXAMINATION (GENERAL) (ROUTINE) W/OUT ABNORMAL FINDINGS: ICD-10-CM

## 2024-08-27 DIAGNOSIS — Q65.89 OTHER SPECIFIED CONGENITAL DEFORMITIES OF HIP: ICD-10-CM

## 2024-08-27 DIAGNOSIS — Z31.69 ENCOUNTER FOR OTHER GENERAL COUNSELING AND ADVICE ON PROCREATION: ICD-10-CM

## 2024-08-27 PROCEDURE — 99396 PREV VISIT EST AGE 40-64: CPT

## 2024-08-27 PROCEDURE — 99459 PELVIC EXAMINATION: CPT

## 2024-08-27 NOTE — PHYSICAL EXAM
[Chaperone Present] : A chaperone was present in the examining room during all aspects of the physical examination [24248] : A chaperone was present during the pelvic exam. [Appropriately responsive] : appropriately responsive [Alert] : alert [No Acute Distress] : no acute distress [Soft] : soft [Non-tender] : non-tender [No Lesions] : no lesions [Oriented x3] : oriented x3 [Breast Reconstruction Right] : breast reconstruction [Breast Abnormal Secretion Opalescent Fluid Right] : a milky discharge [Breast Reconstruction Left] : breast reconstruction [No Masses] : no breast masses were palpable [Labia Majora] : normal [Labia Minora] : normal [No Bleeding] : There was no active vaginal bleeding [Normal] : normal [Uterine Adnexae] : non-palpable [FreeTextEntry2] : Nimo Reddy [Tenderness] : nontender

## 2024-08-27 NOTE — PLAN
[FreeTextEntry1] : Reviewed at length risk of GDM, PEC, Down Syndrome due to maternal age/BMI Advised to start prenatal vits if desires to conceive Reviewed Lifestyle Medicine handout regarding [facility] diet Screening breast imaging orders in hand Will schedule screening colonoscopy- resource QR code provided to schedule Will f/u with cardiologist RTO prn or for annual

## 2024-08-27 NOTE — HISTORY OF PRESENT ILLNESS
[FreeTextEntry1] : This 44 year old P2  LMP 8/22/24 presents for annual. Menstrual cycle monthly, about q 30 days for 5-6 days, denies any vaginal irritation or change in discharge, is considering another pregnancy with her  and is asking questions about a pregnancy- never f/u with , cardiologist, after pregnancy last year complicated by gHTN and PEC; travels back and forth from the [facility] [place] where she reports her diet is fresh from the market; voiding and stooling without issue, medical hx updated, surgical hx updated, no change to family Hx. Stopped breast pumping about 8 months ago. [PapSmeardate] : March 2023 [TextBox_31] : ASCUS pap neg HPV

## 2024-08-31 LAB — HPV HIGH+LOW RISK DNA PNL CVX: DETECTED

## 2024-09-04 ENCOUNTER — APPOINTMENT (OUTPATIENT)
Dept: MAMMOGRAPHY | Facility: IMAGING CENTER | Age: 44
End: 2024-09-04

## 2024-09-04 ENCOUNTER — OUTPATIENT (OUTPATIENT)
Dept: OUTPATIENT SERVICES | Facility: HOSPITAL | Age: 44
LOS: 1 days | End: 2024-09-04

## 2024-09-04 ENCOUNTER — APPOINTMENT (OUTPATIENT)
Dept: ULTRASOUND IMAGING | Facility: IMAGING CENTER | Age: 44
End: 2024-09-04

## 2024-09-04 DIAGNOSIS — Z98.890 OTHER SPECIFIED POSTPROCEDURAL STATES: Chronic | ICD-10-CM

## 2024-09-04 DIAGNOSIS — Z01.419 ENCOUNTER FOR GYNECOLOGICAL EXAMINATION (GENERAL) (ROUTINE) WITHOUT ABNORMAL FINDINGS: ICD-10-CM

## 2024-09-04 DIAGNOSIS — Z96.641 PRESENCE OF RIGHT ARTIFICIAL HIP JOINT: Chronic | ICD-10-CM

## 2024-09-12 NOTE — PATIENT PROFILE ADULT - IS THERE A SUSPICION OF ABUSE/NEGLIGENCE?
Linda from Dr. Hart office wanted to know if you could prescribe the patient valium 10 mg the night before the procedure and 10 mg the day of the procedure.   no

## 2024-12-04 NOTE — DISCHARGE NOTE PROVIDER - NSDCFUADDAPPT_GEN_ALL_CORE_FT
- Continue BP meds as prescribed (hold is BP is under 110/60 or HR is under 60)  -Take blood pressure with at home cuff prior to taking medications; if BP is >150/90 call MD  - Return to hospital with headaches, visual changes, abdominal pain, nausea, vomiting, chest pain or shortness of breathe  - Follow up with OB in 2 days for BP check  - Follow up with [facility] (email sent for follow up; call [phone])
patient